# Patient Record
Sex: FEMALE | Race: WHITE | Employment: STUDENT | URBAN - METROPOLITAN AREA
[De-identification: names, ages, dates, MRNs, and addresses within clinical notes are randomized per-mention and may not be internally consistent; named-entity substitution may affect disease eponyms.]

---
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
## 2023-01-19 ENCOUNTER — TELEPHONE (OUTPATIENT)
Dept: PRIMARY CARE CLINIC | Age: 23
End: 2023-01-19

## 2023-01-19 RX ORDER — ALBUTEROL SULFATE 90 UG/1
2 AEROSOL, METERED RESPIRATORY (INHALATION) EVERY 6 HOURS PRN
Qty: 1 EACH | Refills: 0 | OUTPATIENT
Start: 2023-01-19

## 2023-01-19 NOTE — TELEPHONE ENCOUNTER
Tried to call patient, phone number is out of service.  Patient will need a new patient appointment before any medications can be refilled, she has not been seen since 2020

## 2023-01-25 RX ORDER — ALBUTEROL SULFATE 90 UG/1
2 AEROSOL, METERED RESPIRATORY (INHALATION) EVERY 6 HOURS PRN
Qty: 1 EACH | Refills: 0 | OUTPATIENT
Start: 2023-01-25

## 2023-03-07 ENCOUNTER — OFFICE VISIT (OUTPATIENT)
Dept: PRIMARY CARE CLINIC | Age: 23
End: 2023-03-07

## 2023-03-07 VITALS
BODY MASS INDEX: 29.88 KG/M2 | OXYGEN SATURATION: 98 % | HEIGHT: 64 IN | RESPIRATION RATE: 12 BRPM | DIASTOLIC BLOOD PRESSURE: 78 MMHG | WEIGHT: 175 LBS | TEMPERATURE: 98 F | HEART RATE: 78 BPM | SYSTOLIC BLOOD PRESSURE: 116 MMHG

## 2023-03-07 DIAGNOSIS — F41.9 ANXIETY: ICD-10-CM

## 2023-03-07 DIAGNOSIS — Z00.00 ENCOUNTER FOR WELL ADULT EXAM WITHOUT ABNORMAL FINDINGS: Primary | ICD-10-CM

## 2023-03-07 LAB
BILIRUBIN, POC: NORMAL
BLOOD URINE, POC: NORMAL
CLARITY, POC: NORMAL
COLOR, POC: NORMAL
GLUCOSE URINE, POC: NORMAL
KETONES, POC: NORMAL
LEUKOCYTE EST, POC: NORMAL
NITRITE, POC: NORMAL
PH, POC: 5.5
PROTEIN, POC: NORMAL
SPECIFIC GRAVITY, POC: 1.03
UROBILINOGEN, POC: 0.2

## 2023-03-07 RX ORDER — LEVONORGESTREL AND ETHINYL ESTRADIOL 0.1-0.02MG
1 KIT ORAL DAILY
COMMUNITY

## 2023-03-07 RX ORDER — ESCITALOPRAM OXALATE 10 MG/1
10 TABLET ORAL DAILY
Qty: 30 TABLET | Refills: 3 | Status: SHIPPED | OUTPATIENT
Start: 2023-03-07

## 2023-03-07 SDOH — ECONOMIC STABILITY: FOOD INSECURITY: WITHIN THE PAST 12 MONTHS, YOU WORRIED THAT YOUR FOOD WOULD RUN OUT BEFORE YOU GOT MONEY TO BUY MORE.: NEVER TRUE

## 2023-03-07 SDOH — ECONOMIC STABILITY: FOOD INSECURITY: WITHIN THE PAST 12 MONTHS, THE FOOD YOU BOUGHT JUST DIDN'T LAST AND YOU DIDN'T HAVE MONEY TO GET MORE.: NEVER TRUE

## 2023-03-07 SDOH — ECONOMIC STABILITY: INCOME INSECURITY: HOW HARD IS IT FOR YOU TO PAY FOR THE VERY BASICS LIKE FOOD, HOUSING, MEDICAL CARE, AND HEATING?: NOT HARD AT ALL

## 2023-03-07 SDOH — ECONOMIC STABILITY: TRANSPORTATION INSECURITY
IN THE PAST 12 MONTHS, HAS THE LACK OF TRANSPORTATION KEPT YOU FROM MEDICAL APPOINTMENTS OR FROM GETTING MEDICATIONS?: NO

## 2023-03-07 SDOH — ECONOMIC STABILITY: HOUSING INSECURITY
IN THE LAST 12 MONTHS, WAS THERE A TIME WHEN YOU DID NOT HAVE A STEADY PLACE TO SLEEP OR SLEPT IN A SHELTER (INCLUDING NOW)?: NO

## 2023-03-07 ASSESSMENT — PATIENT HEALTH QUESTIONNAIRE - PHQ9
1. LITTLE INTEREST OR PLEASURE IN DOING THINGS: 0
SUM OF ALL RESPONSES TO PHQ QUESTIONS 1-9: 0
SUM OF ALL RESPONSES TO PHQ QUESTIONS 1-9: 0
SUM OF ALL RESPONSES TO PHQ9 QUESTIONS 1 & 2: 0
2. FEELING DOWN, DEPRESSED OR HOPELESS: 0
SUM OF ALL RESPONSES TO PHQ QUESTIONS 1-9: 0
SUM OF ALL RESPONSES TO PHQ QUESTIONS 1-9: 0

## 2023-03-07 ASSESSMENT — ENCOUNTER SYMPTOMS
GASTROINTESTINAL NEGATIVE: 1
RESPIRATORY NEGATIVE: 1
EYES NEGATIVE: 1
ALLERGIC/IMMUNOLOGIC NEGATIVE: 1

## 2023-03-07 NOTE — PROGRESS NOTES
Well Adult Note  Name: Ruddy Eaton Date: 3/7/2023   MRN: [id number] Sex: Female   Age: 23 y.o. Ethnicity: Declined   : 2000 Race: White (non-)      Linus Pollock is here for well adult exam.  History:  Pt is here for annual physical exam   Depression: Patient complains of depression. She complains of depressed mood, difficulty concentrating, fatigue, feelings of worthlessness/guilt, and psychomotor agitation. Onset was approximately several months ago, gradually worsening since that time. She denies current suicidal and homicidal plan or intent. Family history significant for anxiety. Possible organic causes contributing are: none. Risk factors: positive family history in  father and mother Previous treatment includes  non3  and none. She complains of the following side effects from the treatment: none. Review of Systems   Constitutional: Negative. HENT: Negative. Eyes: Negative. Respiratory: Negative. Cardiovascular: Negative. Gastrointestinal: Negative. Endocrine: Negative. Genitourinary: Negative. Musculoskeletal: Negative. Skin: Negative. Allergic/Immunologic: Negative. Neurological: Negative. Psychiatric/Behavioral:  Positive for agitation, decreased concentration and dysphoric mood. The patient is nervous/anxious. All other systems reviewed and are negative. Allergies   Allergen Reactions    Strawberry Flavor [Flavoring Agent]          Prior to Visit Medications    Medication Sig Taking?  Authorizing Provider   albuterol sulfate HFA (PROVENTIL HFA) 108 (90 Base) MCG/ACT inhaler Inhale 2 puffs into the lungs every 6 hours as needed for Wheezing Yes Leela President, MD   levonorgestrel-ethinyl estradiol (LESSINA) 0.1-20 MG-MCG per tablet Take 1 tablet by mouth daily  Historical Provider, MD   Naproxen Sodium 220 MG CAPS Take 1 capsule by mouth daily  Patient not taking: Reported on 3/7/2023  Historical Provider, MD   fluticasone [facility]) 50 MCG/ACT nasal spray 1 spray by Nasal route daily. Patient not taking: Reported on 3/7/2023  Jeremiah Mckeon MD         Past Medical History:   Diagnosis Date    Asthma        No past surgical history on file. Family History   Problem Relation Age of Onset    Breast Cancer Mother     Breast Cancer Maternal Grandmother        Social History     Tobacco Use    Smoking status: Never    Smokeless tobacco: Never   Substance Use Topics    Alcohol use: Not Currently     Alcohol/week: 2.0 standard drinks     Types: 2 Drinks containing 0.5 oz of alcohol per week    Drug use: Never       Objective   /78 (Site: Left Upper Arm, Position: Sitting, Cuff Size: Large Adult)   Pulse 78   Temp 98 °F (36.7 °C) (Temporal)   Resp 12   Ht 5' 3.5\" (1.613 m)   Wt 175 lb (79.4 kg)   LMP 02/28/2023   SpO2 98%   BMI 30.51 kg/m²   Wt Readings from Last 3 Encounters:   03/07/23 175 lb (79.4 kg)   01/20/20 171 lb (77.6 kg) (94 %, Z= 1.53)*   08/06/19 168 lb 9.6 oz (76.5 kg) (93 %, Z= 1.50)*     * Growth percentiles are based on CDC (Girls, 2-20 Years) data. There were no vitals filed for this visit. Physical Exam  Vitals reviewed. Constitutional:       General: She is not in acute distress. Appearance: She is well-developed. She is not diaphoretic. HENT:      Head: Normocephalic and atraumatic. Right Ear: External ear normal.      Left Ear: External ear normal.      Nose: Nose normal.      Mouth/Throat:      Pharynx: No oropharyngeal exudate. Eyes:      General: No scleral icterus. Right eye: No discharge. Left eye: No discharge. Conjunctiva/sclera: Conjunctivae normal.      Pupils: Pupils are equal, round, and reactive to light. Neck:      Thyroid: No thyromegaly. Vascular: No JVD. Trachea: No tracheal deviation. Cardiovascular:      Rate and Rhythm: Normal rate and regular rhythm. Heart sounds: Normal heart sounds. No murmur heard. No friction rub. No gallop. Pulmonary:      Effort: Pulmonary effort is normal. No respiratory distress. Breath sounds: Normal breath sounds. No stridor. No wheezing or rales. Chest:      Chest wall: No tenderness. Abdominal:      General: Bowel sounds are normal. There is no distension. Palpations: Abdomen is soft. There is no mass. Tenderness: There is no abdominal tenderness. There is no guarding or rebound. Musculoskeletal:         General: No tenderness. Normal range of motion. Cervical back: Normal range of motion and neck supple. Lymphadenopathy:      Cervical: No cervical adenopathy. Skin:     General: Skin is warm and dry. Coloration: Skin is not pale. Findings: No erythema or rash. Neurological:      Mental Status: She is alert and oriented to person, place, and time. Cranial Nerves: No cranial nerve deficit. Motor: No abnormal muscle tone. Coordination: Coordination normal.      Deep Tendon Reflexes: Reflexes are normal and symmetric. Reflexes normal.   Psychiatric:         Behavior: Behavior normal.         Thought Content: Thought content normal.         Judgment: Judgment normal.         Assessment   Plan    Diagnosis Orders   1.  Encounter for well adult exam without abnormal findings  Basic Metabolic Panel    CBC    Hemoglobin A1C    Lipid Panel    TSH        See orders   Recommend counseling   Close follow up              Personalized Preventive Plan   Current Health Maintenance Status  Immunization History   Administered Date(s) Administered    DTaP 2002, 2002, 2002, 07/25/2003, 07/16/2007    HPV 9-valent [facility]) 08/25/2016    HPV Quadrivalent (Gardasil) 06/13/2016    Hepatitis A 07/07/2014, 06/13/2016    Hepatitis B 2002, 2002, 2002    Hib, unspecified 2002, 2002, 2002, 12/31/2003    MMR 07/25/2003, 07/16/2007    Meningococcal MCV4P (Menactra) 08/05/2013, 07/11/2018    Polio IPV (IPOL) 2002, 2002, 10/31/2003, 07/16/2007    Tdap (Boostrix, Adacel) 08/05/2013    Varicella (Varivax) 12/31/2003, 07/16/2007        Health Maintenance   Topic Date Due    COVID-19 Vaccine (1) Never done    Pneumococcal 0-64 years Vaccine (1 - PCV) Never done    HPV vaccine (3 - 2-dose series) 12/13/2016    HIV screen  Never done    Chlamydia/GC screen  Never done    Hepatitis C screen  Never done    Flu vaccine (1) Never done    DTaP/Tdap/Td vaccine (7 - Td or Tdap) 08/05/2023    Depression Screen  03/07/2024    Hepatitis A vaccine  Completed    Hib vaccine  Completed    Varicella vaccine  Completed    Meningococcal (ACWY) vaccine  Completed     Recommendations for Preventive Services Due: see orders and patient instructions/AVS.    No follow-ups on file.

## 2023-03-07 NOTE — PATIENT INSTRUCTIONS
Starting a Weight Loss Plan: Care Instructions  Overview     If you're thinking about losing weight, it can be hard to know where to start. Your doctor can help you set up a weight loss plan that best meets your needs. You may want to take a class on nutrition or exercise, or you could join a weight loss support group. If you have questions about how to make changes to your eating or exercise habits, ask your doctor about seeing a registered dietitian or an exercise specialist.  It can be a big challenge to lose weight. But you don't have to make huge changes at once. Make small changes, and stick with them. When those changes become habit, add a few more changes. If you don't think you're ready to make changes right now, try to pick a date in the future. Make an appointment to see your doctor to discuss whether the time is right for you to start a plan. Follow-up care is a key part of your treatment and safety. Be sure to make and go to all appointments, and call your doctor if you are having problems. It's also a good idea to know your test results and keep a list of the medicines you take. How can you care for yourself at home? Set realistic goals. Many people expect to lose much more weight than is likely. A weight loss of 5% to 10% of your body weight may be enough to improve your health. Get family and friends involved to provide support. Talk to them about why you are trying to lose weight, and ask them to help. They can help by participating in exercise and having meals with you, even if they may be eating something different. Find what works best for you. If you do not have time or do not like to cook, a program that offers meal replacement bars or shakes may be better for you. Or if you like to prepare meals, finding a plan that includes daily menus and recipes may be best.  Ask your doctor about other health professionals who can help you achieve your weight loss goals.   A dietitian can help you make healthy changes in your diet. An exercise specialist or  can help you develop a safe and effective exercise program.  A counselor or psychiatrist can help you cope with issues such as depression, anxiety, or family problems that can make it hard to focus on weight loss. Consider joining a support group for people who are trying to lose weight. Your doctor can suggest groups in your area. Where can you learn more? Go to http://www.woods.com/ and enter U357 to learn more about \"Starting a Weight Loss Plan: Care Instructions. \"  Current as of: August 25, 2022               Content Version: 13.5  © 7441-9632 [employer]. Care instructions adapted under license by [facility] [place] ([facility]). If you have questions about a medical condition or this instruction, always ask your healthcare professional. [address] any warranty or liability for your use of this information.

## 2023-03-17 ENCOUNTER — OFFICE VISIT (OUTPATIENT)
Dept: PSYCHOLOGY | Age: 23
End: 2023-03-17

## 2023-03-17 DIAGNOSIS — F41.0 GENERALIZED ANXIETY DISORDER WITH PANIC ATTACKS: Primary | ICD-10-CM

## 2023-03-17 DIAGNOSIS — F33.1 MODERATE EPISODE OF RECURRENT MAJOR DEPRESSIVE DISORDER (HCC): ICD-10-CM

## 2023-03-17 DIAGNOSIS — F41.1 GENERALIZED ANXIETY DISORDER WITH PANIC ATTACKS: Primary | ICD-10-CM

## 2023-03-17 ASSESSMENT — PATIENT HEALTH QUESTIONNAIRE - PHQ9
SUM OF ALL RESPONSES TO PHQ QUESTIONS 1-9: 16
8. MOVING OR SPEAKING SO SLOWLY THAT OTHER PEOPLE COULD HAVE NOTICED. OR THE OPPOSITE, BEING SO FIGETY OR RESTLESS THAT YOU HAVE BEEN MOVING AROUND A LOT MORE THAN USUAL: 2
7. TROUBLE CONCENTRATING ON THINGS, SUCH AS READING THE NEWSPAPER OR WATCHING TELEVISION: 3
4. FEELING TIRED OR HAVING LITTLE ENERGY: 3
SUM OF ALL RESPONSES TO PHQ9 QUESTIONS 1 & 2: 4
9. THOUGHTS THAT YOU WOULD BE BETTER OFF DEAD, OR OF HURTING YOURSELF: 0
1. LITTLE INTEREST OR PLEASURE IN DOING THINGS: 2
3. TROUBLE FALLING OR STAYING ASLEEP: 0
SUM OF ALL RESPONSES TO PHQ QUESTIONS 1-9: 16
5. POOR APPETITE OR OVEREATING: 2
2. FEELING DOWN, DEPRESSED OR HOPELESS: 2
6. FEELING BAD ABOUT YOURSELF - OR THAT YOU ARE A FAILURE OR HAVE LET YOURSELF OR YOUR FAMILY DOWN: 2

## 2023-03-17 ASSESSMENT — ANXIETY QUESTIONNAIRES
5. BEING SO RESTLESS THAT IT IS HARD TO SIT STILL: 2
4. TROUBLE RELAXING: 2
7. FEELING AFRAID AS IF SOMETHING AWFUL MIGHT HAPPEN: 3
1. FEELING NERVOUS, ANXIOUS, OR ON EDGE: 3
2. NOT BEING ABLE TO STOP OR CONTROL WORRYING: 3
IF YOU CHECKED OFF ANY PROBLEMS ON THIS QUESTIONNAIRE, HOW DIFFICULT HAVE THESE PROBLEMS MADE IT FOR YOU TO DO YOUR WORK, TAKE CARE OF THINGS AT HOME, OR GET ALONG WITH OTHER PEOPLE: SOMEWHAT DIFFICULT
3. WORRYING TOO MUCH ABOUT DIFFERENT THINGS: 3
GAD7 TOTAL SCORE: 19
6. BECOMING EASILY ANNOYED OR IRRITABLE: 3

## 2023-03-17 NOTE — PATIENT INSTRUCTIONS
Return to see Dr. Mando Plummer in 2 weeks  Practice box breathing daily to aid in anxiety/stress response  Practice grounding exercises  Review handout on anxiety   Practice progressive muscle relaxation to aid in relaxation  Continue exercising  Call PC office if mood significantly worsens or you begin having suicidal thoughts    How to do Box Breathing  Step 1: Breathe in counting to four slowly. Feel the air enter your lungs. Step 2: Hold your breath for 4 seconds. Try to avoid inhaling or exhaling for 4 seconds. Step 3: Slowly exhale through your mouth for 4 seconds. Step 4: Repeat steps 1 to 3 until you feel re-centered. Grounding  Grounding is a technique that helps keep you focused on the present moment by reorienting you to reality in the here-and-now. Grounding skills can be helpful in managing overwhelming feelings or intense anxiety. They help you to regain your mental focus from an often intensely emotional state. Grounding skills occur within two specific approaches:   Sensory Awareness and Cognitive Awareness    1. Sensory Awareness (awareness of senses)    Grounding Exercise #1:   Keep your eyes open, look around the room, notice your surroundings, notice  details. Hold a pillow, stuffed animal or a ball. Place a cool cloth or hold something cool (e.g., can of soda) on your forehead or the inside of your wrist   Listen to soothing music   Put your feet firmly on the ground   FOCUS on someones voice or a neutral conversation. Grounding Exercise #2:   The [id number] Exercise   Name 5 things you can see in the room with you. Name 4 things you can feel Cookie Esteban on my back or feet on floor)   Name 3 things you can hear right now (fingers tapping on keyboard or tv)   Name 2 things you can smell right now (or, 2 things you like the smell of)   Name 1 good thing about yourself    Grounding Exercise #3  5/5/5 Grounding Exercise  What are 5 things you can see?   What are 5 things you can feel?  What are 5 things you can hear?    -Repeat with 4 different observations for each, 3 different for each, 2 different for each, etc.   -If you get to 1 and are still feeling anxious, re-start at 5 with 5 different things you can see. 2. Cognitive Awareness (awareness of thoughts)    Grounding Exercise #4: Re-orient yourself in place and time by asking yourself some or all of these questions:  1. Where am I?  2. What is today? 3. What is the date? 4. What is the month? 5. What is the year? 6. How old am I?  7. What season is it? The Physiology of Anxiety    When you sense danger, your brain activates your autonomic nervous system. The two branches of your autonomic nervous system, the sympathetic and the parasympathetic, control your body's energy level in order to prepare you for action. The sympathetic nervous system controls your fight or flight response and releases energy to prepare you for action. The parasympathetic nervous system is your bodys relaxation/recovery system:  it returns your body to a normal state when the danger is over. The sympathetic nervous system is an all-or-none system. That means that when its activated it quickly turns on all of its component parts (which is a great way for an emergency response system to operate):    Rapid Heart Rate, Rapid Breathing:  The alarm reaction increases the heart rate and breathing rate so that we are alert and our muscles are ready for action. These changes also help insure that the muscles and brain will have enough oxygen and energy for defense. At the same time, blood flow to the skin decreases, which prevents us from losing as much blood if we are wounded. Sweating:  Sweating helps to cool the body during exertion, making it more efficient. Blase Eron is what some people feel when sweating occurs at the same time that blood flow to the skin decreases.     Tight Chest, Tingling, Numbness, Hot Flushes, Trembling: Hyperventilation occurs when we breathe rapidly but do not expend the energy with muscle action, like revving a car while holding down the brake. This can lead to feelings of tingling and numbness, hot flushes, and increased sweating. When rapid chest breathing and muscle tension occur at the same time, people feel chest pain, breathlessness, and choking. Upset Stomach, Diarrhea:  Digestion isnt needed during times of danger, and the sympathetic nervous system shuts it down, leading to dry mouth and an upset stomach. Since excess weight isnt needed in times of acute danger, the body may eliminate the lower digestive track, which causes diarrhea. Blurred Vision, Derealization, Depersonalization: It is common for our pupils to dilate during times of danger. Although this improves night vision by increasing the amount of light that can enter the eye, it can also lead to blurred or brighter vision during the day. These changes in visual perception, when combined with the other unusual physical sensations mentioned above, can contribute to feelings of unreality, such as derealization and depersonalization. When this alarm system activates the brain automatically takes in, like a flashbulb camera, everything that can be taken in by the five senses (sights, sounds, smells, tastes, and touch). The goal is to keep us alive in the future. If I can remember everything about this situation then I can prevent myself from ever being in a similar situation. While the goal is survival, a natural byproduct is that the brain takes in EVERYTHING which means that certain things about our environment that were once neutral (i.e., the smell of car exhaust, a crowded restaurant, etc.) now have a negative association. For example, in a classic scientific experiment, scientists were measuring the salivation of dogs to the presentation of dog food.   In the process of measuring the salivation of dogs they found, unexpectedly that dogs began to salivate not only when food was present, but at the site of researchers in white coats and the ring of bell caused by the opening of a door. It was concluded that the dogs began to associate these previously neutral stimuli with getting food and, as a result, they would salivate if these were present even without the presence of food. WHY WONT IT GO AWAY? Avoidance is the key. If you stay away from those things that illicit the fear response your brain gets to maintain that it is dangerous and if you leave a situation because it illicits the fear response your brain convinces you that without leaving you would have never survived. If we never got back on the bike after we fell for the first time that would be our last memory of riding bikes and none of us would be riding bikes today. WHAT DO I DO NOW? The answer is simple, but the act is difficult. We have to go towards those things our brain tells us we must avoid. The goal of in-vivo exposure is to relearn through gradual exposure the following: These uncomfortable feelings will decrease with time. I do not have to leave or avoid them all together as they will decrease. There is nothing to be afraid of. You will see that the things you have been avoiding are not inherently dangerous, but you cannot learn that without exposure. I can do it. No longer do your actions have to be dictated by anxiety or fear. Personal Thought  Control. Our thinking often creates anxiety for us. Getting better control of our thinking can go a long way in helping us cope. The following steps can be useful. Let yourself become aware of thoughts you have when you are anxious. What are the words that you are saying to yourself at that moment? Sometimes it takes a little practice before we become aware of our thoughts.   Some examples might be:  I know something bad is going to happen, or This is horrible or Laron Garcia is this happening to me!?  Write your thoughts down. Its much easier to work with our thoughts, analyze them, and replace them if they are in black and white.   Ask yourself the following questions about your thoughts:  Is it true? (Is it logically correct? Where is the evidence to support the truth of that thought? Are there alternative ways of thinking that would be more correct?). If a thought is not as true as it could be, replace it with a more realistic and helpful one. The majority of thoughts we have that generate anxiety are not the most realistic appraisals of the situation. So what? (If this is logically correct, what does it mean to me? Is there anything I can do about the situation? Is it in my best interest to get anxious about this?). Use coping self-statements. When feeling anxious, you may be able to tell yourself automatic phrases without thinking too much about it. A couple of examples would be phrases such as Its OK, I can handle it, or Ive been through things like this before and have done all right.   Notice that these statements tend to be true for all of us. Notice a change in your emotional state as you change your thinking. As your thoughts become more realistic, you will probably notice a decrease in anxiety and tension, and an increase in your ability to cope. The Stress Response and How It Can Affect You   The stress response, or fight or flight response is the emergency reaction system of the body. It is there to keep you safe in emergencies. The stress response includes physical and thought responses to your perception of various situations. When the stress response is turned on, your body may release substances like adrenaline and cortisol. Your organs are programmed to respond in certain ways to situations that are viewed as challenging or threatening. The stress response can work against you.  You can turn it on when you dont really need it and, as a result, perceive something as an emergency when its really not. It can turn on when you are just thinking about past or future events. Harmless, chronic conditions can be intensified by the stress response activating too often, with too much intensity, or for too long. Stress responses can be different for different individuals. Below is a list of some common stress related responses people have. (Catarina the responses you have had in the last 2 weeks.)     Physical Responses   Muscle aches   Insomnia   ? Heart rate   Headache   Weight gain   Nausea   Constipation   Dry mouth   Muscle twitching  Weight loss   Low energy   Weakness   Tight chest   Diarrhea   Dizziness   Trembling   Stomach cramps  Chills    Hot flashes   Sweating   Pounding heart  Choking feeling  Chest pain   Leg cramps   Numb hands/feet Dry throat   Appetite change  Face flushing   ? Blood pressure  Light-headedness  Feeling faint       Troubleswallowing   Rash ? Urination   Neck pain     Tingling hands/feet     Emotional and Thought Responses   Restlessness   Agitation   Insecurity            Worthlessness   Anxiety   Stress    Depression            Hopelessness   Guilt    Defensiveness  Anger           Racing thoughts   Nightmares   Intense thinking  Sensitivity          Expecting the worst   Numbness   Lack of motivation  Mood swings             Forgetfulness   ? Concentration  Rigidity              Preoccupation  Intolerance     Behavioral Responses   Avoidance   Withdrawal   Neglect   ? Alcohol use    Smoking   ? Eating   Arguing       Poor appearance   ? Spending   Poor hygiene   ? Eating  Seeking reassurance   Nail biting   Skin picking   ? Talking        ? Body checking   Sexual problems  Foot tapping  Fidgeting Rapid walking    ? Exercise   Teeth clenching           Multitasking  Aggressive speaking       ? Fun activities  ? Sleeping      ?  Relaxing activities     Seeking information     The parasympathetic nervous system in your body is designed to turn on your bodys relaxation response. Your behaviors and thinking can keep your bodys natural relaxation response from operating at its best.   Getting your body to relax on a daily basis for at least brief periods can help decrease unpleasant stress responses. Learning to relax your body, through specific breathing and relaxation exercises as well as by minimizing stressful thinking, can help your bodys natural relaxation system be more effective. Progressive Muscle Relaxation  Progressive Muscle Relaxation is a relaxation technique used to release stress. It can relax the muscles and lower blood pressure, heart rate, and respiration. Progressive Muscle Relaxation is the tensing and then relaxing each muscle group of the body, one group at a time. Though this technique is simple it may take several sessions before it is 'mastered.'   Some people prefer to listen to an audiotape (or CD or mp3) that guides one through progressive muscle relaxation. The scripts are usually about 20 minutes long. Progressive muscle relaxation may be done sitting or lying down. Tense up a group of muscles - tense hard but don't strain - and hold for about 5-10 seconds. Release the tension from the muscles all at once. Stay relaxed for 10 - 20 seconds. Some people prefer to count, for example:  Tense for count of 5  Release all at once  Rest for count of 10  . ..or  Tense for count of 10  Release all at once  Rest for count of 20  Pay close attention to the feeling of relaxation when you release the contracted muscles. When going through the muscle groups, some people start with the hands, others with the feet. You may do one side of the body (hand, arm, leg, foot) at a time or do both sides at the same time. Listening to a prerecorded script that guides you through the process is helpful.   Sample of Progressive Muscle Relaxation Exercise:   Hands - Clench fists  tense for 5, release, rest for 10 Right forearms and hands - Extend arm, elbow locked, and bend hand back at the wrist  tense for 5, release, rest for 10      Upper right arm - Bend arms at elbows and flex biceps  tense for 5, release, rest for 10     Forehead - wrinkle forehead into frown, tense, release, rest, and/or raise eyebrows  tense for 5, release, rest for 10      Eyes - close eyes tightly, hold and release  tense for 5, release, rest for 10      Mouth - press lips tightly together  tense for 5, release, rest for 10      Jaw - open mouth wide and stick out tongue  tense for 5, release, rest for 10      Buttocks - tense  tense for 5, release, rest for 10      Abdomen  tense for 5, release, rest for 10      Chest  tense for 5, release, rest for 10      Back - arch back  tense for 5, release, rest for 10      Neck and shoulders  tense for 5, release, rest for 10      Thighs  tense for 5, release, rest for 10      Lower legs and feet - Point toes toward shin  tense for 5, release, rest for 10      Feet - Point toes and curl them under  tense for 5, release, rest for 10     You may repeat relaxing and tensing muscle groups that have you have already done to relax them further.

## 2023-03-17 NOTE — PROGRESS NOTES
Behavioral Health Consultation  ZUNILDA ERNST Psy.D. Psychologist  3/17/2023  9:01 AM EDT      Time spent with Patient: 30 minutes  This is patient's first [facility] appointment. Reason for Consult: depression/anxiety  Referring Provider: Lizbeth Krishnamurthy MD  [place] [id number] [id number]     Pt provided informed consent for the behavioral health program. Discussed with patient model of service to include the limits of confidentiality (i.e. abuse reporting, suicide intervention, etc.) and short-term intervention focused approach. Pt indicated understanding. Feedback given to PCP. S:  Patient presents with concerns about depression and anxiety. Overworks herself a lot. Feels she does not have enough time in the day. Shows horses but stopped because she does not have time for it. Does not stop moving throughout day. School has contributed to anxiety and stress. Reported panic attacks when feeling overwhelmed. Crying, shaking, feeling hot, heart racing, SOB, feeling dizzy, tingling. Episodes last around 15 minutes. Currently having them few times/week. Reported father \"puts her down\" and puts a lot of pressure on her and her brother to work. Contributes to low mood. Reported low mood and anxious sx have been present since childhood. Patient finds relief from spending time with friends, going to the gym. Goals for treatment include building coping skills for anxiety, mood. Patient lives with parents. She is a full-time college student. Works at [employer]. Daily caffeine use includes coffee/AM. Denied cigarette use, current alcohol use (two drinks/weekend), denied illegal drug use. There is regular exercise. Patient describes fatigue throughout day. Denied issues initiating or staying asleep. Patient enjoys the following hobbies: horses, exercising, spending time with friends. Patient describes good social support from mother and friend.  She does not identify with specific Orthodox/culture. Familial MH history is positive for anxiety (biological mother and father). Mental Health History  Psychotropic medications: lexapro 10mg; recently started this- feels it helps with overthinking and is less overwhelmed  Psychiatric hospitalizations: denied  Suicidal ideation/homicidal ideation: denied  Suicide attempts: denied  Previous outpatient treatment: denied    O:  MSE:    Attitude: cooperative and friendly  Consciousness: alert  Orientation: oriented to person, place, time, general circumstance  Memory: recent and remote memory intact  Attention/Concentration: intact during session  Speech: normal rate and volume, well-articulated  Mood: down  Affect:  mildly dysphoric  Perception: within normal limits  Thought Content: within normal limits  Thought Process: logical, coherent and goal-directed  Insight: good  Judgment: intact  Ability to understand instructions: Yes  Ability to respond meaningfully: Yes  Morbid Ideation: no   Suicide Assessment: no suicidal ideation, plan, or intent  Homicidal Ideation: no    History:    Medications:   Current Outpatient Medications   Medication Sig Dispense Refill    levonorgestrel-ethinyl estradiol (LESSINA) 0.1-20 MG-MCG per tablet Take 1 tablet by mouth daily      escitalopram (LEXAPRO) 10 MG tablet Take 1 tablet by mouth daily 30 tablet 3    albuterol sulfate HFA (PROVENTIL HFA) 108 (90 Base) MCG/ACT inhaler Inhale 2 puffs into the lungs every 6 hours as needed for Wheezing 1 Inhaler 0     No current facility-administered medications for this visit.      Social History:   Social History     Socioeconomic History    Marital status: Unknown     Spouse name: Not on file    Number of children: Not on file    Years of education: Not on file    Highest education level: Not on file   Occupational History    Not on file   Tobacco Use    Smoking status: Never    Smokeless tobacco: Never   Substance and Sexual Activity    Alcohol use: Not Currently Alcohol/week: 2.0 standard drinks     Types: 2 Drinks containing 0.5 oz of alcohol per week    Drug use: Never    Sexual activity: Yes     Partners: Male   Other Topics Concern    Not on file   Social History Narrative    Not on file     Social Determinants of Health     Financial Resource Strain: Low Risk     Difficulty of Paying Living Expenses: Not hard at all   Food Insecurity: No Food Insecurity    Worried About [address] in the Last Year: Never true    [address] in the Last Year: Never true   Transportation Needs: No Transportation Needs    Lack of Transportation (Medical): No    Lack of Transportation (Non-Medical): No   Physical Activity: Not on file   Stress: Not on file   Social Connections: Not on file   Intimate Partner Violence: Not on file   Housing Stability: Unknown    Unable to Pay for Housing in the Last Year: Not on file    Number of Places Lived in the Last Year: Not on file    Unstable Housing in the Last Year: No     TOBACCO:   reports that she has never smoked. She has never used smokeless tobacco.  ETOH:   reports that she does not currently use alcohol after a past usage of about 2.0 standard drinks per week. Family History:   Family History   Problem Relation Age of Onset    Breast Cancer Mother     Breast Cancer Maternal Grandmother        A:  Administered PHQ-9 and CAITLYN-7 (see below). Patient endorses Moderately Severe symptoms of depression and Severe symptoms of anxiety. Denied suicidal ideation. Insight and motivation are good.       PHQ-9 Questionaire 3/17/2023 3/7/2023 1/20/2020 3/4/2019 7/11/2018   Little interest or pleasure in doing things 2 0 0 0 0   Feeling down, depressed, or hopeless 2 0 0 0 0   Trouble falling or staying asleep, or sleeping too much 0 - 0 0 0   Feeling tired or having little energy 3 - 0 0 0   Poor appetite or overeating 2 - 0 0 0   Feeling bad about yourself - or that you are a failure or have let yourself or your family down 2 - 0 0 0 Trouble concentrating on things, such as reading the newspaper or watching television 3 - 0 0 0   Moving or speaking so slowly that other people could have noticed. Or the opposite - being so fidgety or restless that you have been moving around a lot more than usual 2 - 0 0 0   Thoughts that you would be better off dead, or of hurting yourself in some way 0 - 0 0 0   PHQ-9 Total Score 16 0 0 0 0     PHQ Scores 3/17/2023 3/7/2023 1/20/2020 3/4/2019 7/11/2018   PHQ2 Score 4 0 0 0 0   PHQ9 Score 16 0 0 0 0       Interpretation of Total Score Depression Severity: 1-4 = Minimal depression, 5-9 = Mild depression, 10-14 = Moderate depression, 15-19 = Moderately severe depression, 20-27 = Severe depression     CAITLYN-7 SCREENING 3/17/2023   Feeling nervous, anxious, or on edge Nearly every day   Not being able to stop or control worrying Nearly every day   Worrying too much about different things Nearly every day   Trouble relaxing More than half the days   Being so restless that it is hard to sit still More than half the days   Becoming easily annoyed or irritable Nearly every day   Feeling afraid as if something awful might happen Nearly every day   CAITLYN-7 Total Score 19   How difficult have these problems made it for you to do your work, take care of things at home, or get along with other people? Somewhat difficult     CAITLYN 7 SCORE 3/17/2023   CAITLYN-7 Total Score 19       Interpretation of Total Score. Anxiety Severity: Score 0-4: Minimal Anxiety. Score 5-9: Mild Anxiety. Score 10-14: Moderate Anxiety. Score greater than 15: Severe Anxiety. Diagnosis:  1. Generalized anxiety disorder with panic attacks    2.  Moderate episode of recurrent major depressive disorder (HCC)        Past Medical History:      Diagnosis Date    Asthma        Plan:  Pt interventions:  Established rapport, Conducted functional assessment, [place]-setting to identify pt's primary goals for [facility] visit / overall health, Supportive techniques, Emphasized self-care as important for managing overall health, Provided Psychoeducation re: anxiety and the stress response, and Emphasized importance of regular practice of relaxation strategies to target / promote anxiety, panic, and stress management    Pt Behavioral Change Plan:   See Pt Instructions.

## 2023-03-31 ENCOUNTER — OFFICE VISIT (OUTPATIENT)
Dept: PSYCHOLOGY | Age: 23
End: 2023-03-31
Payer: COMMERCIAL

## 2023-03-31 DIAGNOSIS — F33.1 MODERATE EPISODE OF RECURRENT MAJOR DEPRESSIVE DISORDER (HCC): ICD-10-CM

## 2023-03-31 DIAGNOSIS — F41.1 GENERALIZED ANXIETY DISORDER WITH PANIC ATTACKS: Primary | ICD-10-CM

## 2023-03-31 DIAGNOSIS — F41.0 GENERALIZED ANXIETY DISORDER WITH PANIC ATTACKS: Primary | ICD-10-CM

## 2023-03-31 PROCEDURE — 90832 PSYTX W PT 30 MINUTES: CPT

## 2023-03-31 ASSESSMENT — PATIENT HEALTH QUESTIONNAIRE - PHQ9
8. MOVING OR SPEAKING SO SLOWLY THAT OTHER PEOPLE COULD HAVE NOTICED. OR THE OPPOSITE, BEING SO FIGETY OR RESTLESS THAT YOU HAVE BEEN MOVING AROUND A LOT MORE THAN USUAL: 2
SUM OF ALL RESPONSES TO PHQ QUESTIONS 1-9: 14
7. TROUBLE CONCENTRATING ON THINGS, SUCH AS READING THE NEWSPAPER OR WATCHING TELEVISION: 3
5. POOR APPETITE OR OVEREATING: 2
6. FEELING BAD ABOUT YOURSELF - OR THAT YOU ARE A FAILURE OR HAVE LET YOURSELF OR YOUR FAMILY DOWN: 1
SUM OF ALL RESPONSES TO PHQ QUESTIONS 1-9: 14
SUM OF ALL RESPONSES TO PHQ9 QUESTIONS 1 & 2: 3
3. TROUBLE FALLING OR STAYING ASLEEP: 0
2. FEELING DOWN, DEPRESSED OR HOPELESS: 1
4. FEELING TIRED OR HAVING LITTLE ENERGY: 3
SUM OF ALL RESPONSES TO PHQ QUESTIONS 1-9: 14
1. LITTLE INTEREST OR PLEASURE IN DOING THINGS: 2
9. THOUGHTS THAT YOU WOULD BE BETTER OFF DEAD, OR OF HURTING YOURSELF: 0
SUM OF ALL RESPONSES TO PHQ QUESTIONS 1-9: 14

## 2023-03-31 ASSESSMENT — ANXIETY QUESTIONNAIRES
GAD7 TOTAL SCORE: 17
5. BEING SO RESTLESS THAT IT IS HARD TO SIT STILL: 2
2. NOT BEING ABLE TO STOP OR CONTROL WORRYING: 2
4. TROUBLE RELAXING: 3
7. FEELING AFRAID AS IF SOMETHING AWFUL MIGHT HAPPEN: 2
3. WORRYING TOO MUCH ABOUT DIFFERENT THINGS: 2
1. FEELING NERVOUS, ANXIOUS, OR ON EDGE: 3
6. BECOMING EASILY ANNOYED OR IRRITABLE: 3

## 2023-03-31 NOTE — PATIENT INSTRUCTIONS
Return to see Dr. Alonzo Vega in 2 weeks  Practice assertive communication handout  Begin to identify commonly used cognitive distortions   Review stress management self-talk handout below  Call PC office if mood significantly worsens    Assertive Communication     Assertiveness Is Simple but Hard    NonAssertive   Assertive   Aggressive     (Passive)            (Tactful)             (Rude)           H onest         X  H onest          X  H onest             X A ppropriate        X  A ppropriate                 A ppropriate             X R espectful        X  R espectful             R espectful     D irect                   X  D irect        X  D irect      Assertiveness involves respecting your rights and the rights of others. Important Facts About Assertiveness      Use I or me statements such as When you do ______, I feel _____.     Voice tone, eye contact, and body posture are important parts of assertive communication. Use a steady and calm voice, stand or sit up straight, look the other person in the eyes without glaring. Feelings are usually only one word (e.g. angry, anxious, happy, sad, hurt, frustrated, joyful)    Remember, assertiveness doesnt guarantee that you will get what you want or that the other person will understand your concerns or be happy with what you said. It does improve the chances that the other person will understand what you want or how you feel and thus improve your chances of communicating effectively. Four Essential Steps to Assertive Communication   1. Tell the person what you think about their behavior without accusing them. 2. Tell them how you feel when they behave a certain way. 3. Tell them how their behavior affects you and your relationship with them. 4. Tell them what you would prefer them to do instead.      XYZ* Formula for Effective Communication   The goal of the XYZ* formula is to express the way you feel (internal world) in response to others

## 2023-03-31 NOTE — PROGRESS NOTES
than half the days Nearly every day   CAITLYN-7 Total Score 17 19   How difficult have these problems made it for you to do your work, take care of things at home, or get along with other people? - Somewhat difficult     CAITLYN 7 SCORE 3/31/2023 3/17/2023   CAITLYN-7 Total Score 17 19       Interpretation of Total Score. Anxiety Severity: Score 0-4: Minimal Anxiety. Score 5-9: Mild Anxiety. Score 10-14: Moderate Anxiety. Score greater than 15: Severe Anxiety. Diagnosis:    1. Generalized anxiety disorder with panic attacks    2. Moderate episode of recurrent major depressive disorder (HCC)        Past Medical History      Diagnosis Date    Asthma        Plan:  Pt interventions:  Trained in strategies for increasing balanced thinking, Discussed self-care (sleep, nutrition, rewarding activities, social support, exercise), Supportive techniques, Emphasized self-care as important for managing overall health, Provided Psychoeducation re: CBT, and Emphasized importance of regular practice of relaxation strategies to target / promote anxiety/stress management    Pt Behavioral Change Plan:   See Pt Instructions.

## 2023-04-04 ENCOUNTER — OFFICE VISIT (OUTPATIENT)
Dept: PRIMARY CARE CLINIC | Age: 23
End: 2023-04-04
Payer: COMMERCIAL

## 2023-04-04 VITALS
SYSTOLIC BLOOD PRESSURE: 112 MMHG | BODY MASS INDEX: 29.91 KG/M2 | HEIGHT: 64 IN | WEIGHT: 175.2 LBS | DIASTOLIC BLOOD PRESSURE: 70 MMHG | RESPIRATION RATE: 12 BRPM | TEMPERATURE: 97.9 F

## 2023-04-04 DIAGNOSIS — F32.A ANXIETY AND DEPRESSION: Primary | ICD-10-CM

## 2023-04-04 DIAGNOSIS — F41.9 ANXIETY AND DEPRESSION: Primary | ICD-10-CM

## 2023-04-04 PROCEDURE — 99214 OFFICE O/P EST MOD 30 MIN: CPT | Performed by: FAMILY MEDICINE

## 2023-04-04 RX ORDER — ALBUTEROL SULFATE 90 UG/1
2 AEROSOL, METERED RESPIRATORY (INHALATION) EVERY 6 HOURS PRN
Qty: 1 EACH | Refills: 0 | Status: SHIPPED | OUTPATIENT
Start: 2023-04-04

## 2023-04-04 RX ORDER — BUSPIRONE HYDROCHLORIDE 5 MG/1
5 TABLET ORAL 3 TIMES DAILY PRN
Qty: 60 TABLET | Refills: 1 | Status: SHIPPED | OUTPATIENT
Start: 2023-04-04 | End: 2023-05-04

## 2023-04-04 NOTE — PROGRESS NOTES
SUBJECTIVE:  Patient ID: Niles Matos is a 23 y.o. y.o. female     HPI   Depression: Patient is here for follow up on anxiety and depression she thinks the medication has helped some she still have some time where she feels very anxious nervous especially when she has more requirement more things to do more work to do at home she has been meeting with Dr. Sylwia Ardon  Has been helpful she denies current suicidal and homicidal plan or intent. Family history significant for anxiety. Possible organic causes contributing are: none. Risk factors: positive family history in  father and mother Previous treatment includes  NA. She complains of the following side effects from the treatment: none  Past Medical History:   Diagnosis Date    Asthma       No past surgical history on file.   Family History   Problem Relation Age of Onset    Breast Cancer Mother     Breast Cancer Maternal Grandmother      Social History     Socioeconomic History    Marital status: Unknown     Spouse name: None    Number of children: None    Years of education: None    Highest education level: None   Tobacco Use    Smoking status: Never    Smokeless tobacco: Never   Substance and Sexual Activity    Alcohol use: Not Currently     Alcohol/week: 2.0 standard drinks     Types: 2 Drinks containing 0.5 oz of alcohol per week    Drug use: Never    Sexual activity: Yes     Partners: Male     Social Determinants of Health     Financial Resource Strain: Low Risk     Difficulty of Paying Living Expenses: Not hard at all   Food Insecurity: No Food Insecurity    Worried About Running Out of Food in the Last Year: Never true    Ran Out of Food in the Last Year: Never true   Transportation Needs: No Transportation Needs    Lack of Transportation (Medical): No    Lack of Transportation (Non-Medical): No   Housing Stability: Unknown    Unstable Housing in the Last Year: No     Current Outpatient Medications   Medication Sig Dispense Refill    levonorgestrel-ethinyl

## 2023-04-14 ENCOUNTER — OFFICE VISIT (OUTPATIENT)
Dept: PSYCHOLOGY | Age: 23
End: 2023-04-14
Payer: COMMERCIAL

## 2023-04-14 DIAGNOSIS — F33.1 MODERATE EPISODE OF RECURRENT MAJOR DEPRESSIVE DISORDER (HCC): ICD-10-CM

## 2023-04-14 DIAGNOSIS — F41.0 GENERALIZED ANXIETY DISORDER WITH PANIC ATTACKS: Primary | ICD-10-CM

## 2023-04-14 DIAGNOSIS — F41.1 GENERALIZED ANXIETY DISORDER WITH PANIC ATTACKS: Primary | ICD-10-CM

## 2023-04-14 PROCEDURE — 90832 PSYTX W PT 30 MINUTES: CPT

## 2023-04-14 ASSESSMENT — ANXIETY QUESTIONNAIRES
GAD7 TOTAL SCORE: 18
5. BEING SO RESTLESS THAT IT IS HARD TO SIT STILL: 2
6. BECOMING EASILY ANNOYED OR IRRITABLE: 3
4. TROUBLE RELAXING: 2
2. NOT BEING ABLE TO STOP OR CONTROL WORRYING: 3
7. FEELING AFRAID AS IF SOMETHING AWFUL MIGHT HAPPEN: 2
1. FEELING NERVOUS, ANXIOUS, OR ON EDGE: 3
3. WORRYING TOO MUCH ABOUT DIFFERENT THINGS: 3

## 2023-04-14 ASSESSMENT — PATIENT HEALTH QUESTIONNAIRE - PHQ9
1. LITTLE INTEREST OR PLEASURE IN DOING THINGS: 2
9. THOUGHTS THAT YOU WOULD BE BETTER OFF DEAD, OR OF HURTING YOURSELF: 0
SUM OF ALL RESPONSES TO PHQ QUESTIONS 1-9: 20
5. POOR APPETITE OR OVEREATING: 2
SUM OF ALL RESPONSES TO PHQ QUESTIONS 1-9: 20
7. TROUBLE CONCENTRATING ON THINGS, SUCH AS READING THE NEWSPAPER OR WATCHING TELEVISION: 3
6. FEELING BAD ABOUT YOURSELF - OR THAT YOU ARE A FAILURE OR HAVE LET YOURSELF OR YOUR FAMILY DOWN: 2
2. FEELING DOWN, DEPRESSED OR HOPELESS: 3
SUM OF ALL RESPONSES TO PHQ QUESTIONS 1-9: 20
3. TROUBLE FALLING OR STAYING ASLEEP: 3
8. MOVING OR SPEAKING SO SLOWLY THAT OTHER PEOPLE COULD HAVE NOTICED. OR THE OPPOSITE, BEING SO FIGETY OR RESTLESS THAT YOU HAVE BEEN MOVING AROUND A LOT MORE THAN USUAL: 2
SUM OF ALL RESPONSES TO PHQ QUESTIONS 1-9: 20
4. FEELING TIRED OR HAVING LITTLE ENERGY: 3
SUM OF ALL RESPONSES TO PHQ9 QUESTIONS 1 & 2: 5

## 2023-04-18 NOTE — PATIENT INSTRUCTIONS
Return to see Dr. Francisco Gotti in 2 weeks  Continue practicing assertive communication and advocating for yourself  Continue engagement in stress management strategies  Consider setting boundaries at work  Call [facility] office if mood significantly worsens

## 2023-04-28 ENCOUNTER — OFFICE VISIT (OUTPATIENT)
Dept: PSYCHOLOGY | Age: 23
End: 2023-04-28
Payer: COMMERCIAL

## 2023-04-28 DIAGNOSIS — F41.0 GENERALIZED ANXIETY DISORDER WITH PANIC ATTACKS: Primary | ICD-10-CM

## 2023-04-28 DIAGNOSIS — F41.1 GENERALIZED ANXIETY DISORDER WITH PANIC ATTACKS: Primary | ICD-10-CM

## 2023-04-28 DIAGNOSIS — F33.1 MODERATE EPISODE OF RECURRENT MAJOR DEPRESSIVE DISORDER (HCC): ICD-10-CM

## 2023-04-28 PROCEDURE — 90832 PSYTX W PT 30 MINUTES: CPT

## 2023-04-28 ASSESSMENT — ANXIETY QUESTIONNAIRES
1. FEELING NERVOUS, ANXIOUS, OR ON EDGE: 2
GAD7 TOTAL SCORE: 16
2. NOT BEING ABLE TO STOP OR CONTROL WORRYING: 2
7. FEELING AFRAID AS IF SOMETHING AWFUL MIGHT HAPPEN: 2
3. WORRYING TOO MUCH ABOUT DIFFERENT THINGS: 3
6. BECOMING EASILY ANNOYED OR IRRITABLE: 3
4. TROUBLE RELAXING: 2
5. BEING SO RESTLESS THAT IT IS HARD TO SIT STILL: 2

## 2023-04-28 ASSESSMENT — PATIENT HEALTH QUESTIONNAIRE - PHQ9
8. MOVING OR SPEAKING SO SLOWLY THAT OTHER PEOPLE COULD HAVE NOTICED. OR THE OPPOSITE, BEING SO FIGETY OR RESTLESS THAT YOU HAVE BEEN MOVING AROUND A LOT MORE THAN USUAL: 2
SUM OF ALL RESPONSES TO PHQ QUESTIONS 1-9: 18
3. TROUBLE FALLING OR STAYING ASLEEP: 3
4. FEELING TIRED OR HAVING LITTLE ENERGY: 3
7. TROUBLE CONCENTRATING ON THINGS, SUCH AS READING THE NEWSPAPER OR WATCHING TELEVISION: 2
2. FEELING DOWN, DEPRESSED OR HOPELESS: 2
SUM OF ALL RESPONSES TO PHQ QUESTIONS 1-9: 18
9. THOUGHTS THAT YOU WOULD BE BETTER OFF DEAD, OR OF HURTING YOURSELF: 0
SUM OF ALL RESPONSES TO PHQ9 QUESTIONS 1 & 2: 4
1. LITTLE INTEREST OR PLEASURE IN DOING THINGS: 2
SUM OF ALL RESPONSES TO PHQ QUESTIONS 1-9: 18
5. POOR APPETITE OR OVEREATING: 2
SUM OF ALL RESPONSES TO PHQ QUESTIONS 1-9: 18
6. FEELING BAD ABOUT YOURSELF - OR THAT YOU ARE A FAILURE OR HAVE LET YOURSELF OR YOUR FAMILY DOWN: 2

## 2023-04-28 NOTE — PATIENT INSTRUCTIONS
Return to see Dr. Feliz November in 2 weeks  Incorporate more downtime into your routine  View downtime as recharging your battery  Practice assertive communication  Continue engagement in stress management strategies, such as spending time with boyfriend or going to the gym   Reframe thoughts surrounding guilt  Call [facility] office if mood significantly worsens    Cognitive Distortions Checklist    1. All-or-nothing thinking: You look at things in absolute, black-and-white categories. 2. Over-generalization: You view a negative event as a never-ending pattern of defeat. 3. Mental filter: You dwell on the negatives. 4. Discounting the positives: You insist that your accomplishments or positive qualities don't count. 5. Jumping to conclusions:      (a) Mind-reading: You assume that people are reacting negatively to you when  there's no definitive evidence to support this;     (b) Fortune-telling: You arbitrarily predict that things will turn out badly    6. Magnification or minimization: You blow things way out of proportion or you shrink their importance. 7. Emotional reasoning: You reason from how you feel: \"I feel like an idiot, so I really must be one. \"    8. \"Should\" statements: You criticize yourself (or other people) with \"shoulds,\" \"oughts,\" \"musts,\" and \"have tos. \"    9. Labeling: Instead of saying \"I made a mistake,\" you tell yourself, \"I'm a jerk\" or \"a fool\" or \"a loser. \"    10. Personalization and blame: You blame yourself for something you weren't entirely responsible for, or you blame other people and deny your role in the problem. 11. Catastrophizing: You assume the worst case scenario will happen. Self-Defeating Beliefs    1. Emotional Perfectionism. \"I should always feel happy, confident, and in control of my emotions. \"    2. Emotophobia. \"I should never feel angry, anxious, inadequate, jealous, or vulnerable. \"    3. Conflict Phobia. \"People who love each other shouldn't fight. \"    4.
Statement Selected

## 2023-04-28 NOTE — PROGRESS NOTES
Behavioral Health Consultation  ZUNILDA ERNST Psy.D. Psychologist  4/28/2023  10:28 AM EDT      Time spent with Patient: 30 minutes  This is patient's fourth  [facility] appointment. Reason for Consult:    Chief Complaint   Patient presents with    Anxiety    Depression     Referring Provider: Serge Carroll MD  [address]  [phone]    Feedback given to PCP: not indicated at this time. S:  Pt reported school finished yesterday. Will have more free time over the summer. Father had an angry outburst that upset her. Feels she walks on eggshells at home and has experienced this throughout childhood. Fear of disappointing her parents. Discussed bx activation, incorporating more downtime, assertive communication, reframing thoughts related to guilt.      O:  MSE:    Appearance: good hygiene   Attitude: cooperative and friendly  Consciousness: alert  Orientation: oriented to person, place, time, general circumstance  Memory: recent and remote memory intact  Attention/Concentration: intact during session  Psychomotor Activity:normal  Eye Contact: normal  Speech: normal rate and volume, well-articulated  Mood: \"a little better\"  Affect: dysphoric  Perception: within normal limits  Thought Content: within normal limits  Thought Process: logical, coherent and goal-directed  Insight: good  Judgment: intact  Ability to understand instructions: Yes  Ability to respond meaningfully: Yes  Morbid Ideation: no   Suicide Assessment: no suicidal ideation, plan, or intent  Homicidal Ideation: no    History:    Medications:   Current Outpatient Medications   Medication Sig Dispense Refill    escitalopram (LEXAPRO) 10 MG tablet Take 1 tablet by mouth daily 30 tablet 3    albuterol sulfate HFA (PROVENTIL HFA) 108 (90 Base) MCG/ACT inhaler Inhale 2 puffs into the lungs every 6 hours as needed for Wheezing 1 each 0    busPIRone (BUSPAR) 5 MG tablet Take 1 tablet by mouth 3 times daily as needed (anxiety)

## 2023-05-18 RX ORDER — BUSPIRONE HYDROCHLORIDE 5 MG/1
TABLET ORAL
Qty: 60 TABLET | Refills: 0 | Status: SHIPPED | OUTPATIENT
Start: 2023-05-18

## 2023-05-25 DIAGNOSIS — F41.9 ANXIETY: ICD-10-CM

## 2023-05-25 RX ORDER — ESCITALOPRAM OXALATE 10 MG/1
10 TABLET ORAL DAILY
Qty: 30 TABLET | Refills: 3 | Status: SHIPPED | OUTPATIENT
Start: 2023-05-25

## 2023-05-25 NOTE — TELEPHONE ENCOUNTER
Medication:   Requested Prescriptions     Pending Prescriptions Disp Refills    escitalopram (LEXAPRO) 10 MG tablet 30 tablet 3     Sig: Take 1 tablet by mouth daily        Last Filled:      Patient Phone Number: [phone] (home)     Last appt: 4/4/2023   Next appt: 7/6/2023    Last OARRS: No flowsheet data found. Preferred Pharmacy:   [facility] [id number] - [place] [address] [facility]  [address] [place] [address]  Phone: [phone] Fax: [phone]    [facility] [id number] [facility] [phone] Jennifer Durham [phone]  [address] [facility] [address]  Phone: [phone] Fax: [phone]  Medication:   Requested Prescriptions     Pending Prescriptions Disp Refills    escitalopram (LEXAPRO) 10 MG tablet 30 tablet 3     Sig: Take 1 tablet by mouth daily        Last Filled:      Patient Phone Number: [phone] (home)     Last appt: 4/4/2023   Next appt: 7/6/2023    Last OARRS: No flowsheet data found.     Preferred Pharmacy:   [facility] [id number] - [id number] [address] [facility]  [address] [place] [address]  Phone: [phone] Fax: [phone]    [facility] [id number] [facility] [phone] Jenniferden Durham [phone]  [address] [place] [address] [facility] [address]  Phone: [phone] Fax: [phone]

## 2023-05-26 ENCOUNTER — OFFICE VISIT (OUTPATIENT)
Dept: PSYCHOLOGY | Age: 23
End: 2023-05-26
Payer: COMMERCIAL

## 2023-05-26 DIAGNOSIS — F33.1 MODERATE EPISODE OF RECURRENT MAJOR DEPRESSIVE DISORDER (HCC): ICD-10-CM

## 2023-05-26 DIAGNOSIS — F41.1 GENERALIZED ANXIETY DISORDER WITH PANIC ATTACKS: Primary | ICD-10-CM

## 2023-05-26 DIAGNOSIS — F41.0 GENERALIZED ANXIETY DISORDER WITH PANIC ATTACKS: Primary | ICD-10-CM

## 2023-05-26 PROCEDURE — 90832 PSYTX W PT 30 MINUTES: CPT

## 2023-05-26 ASSESSMENT — PATIENT HEALTH QUESTIONNAIRE - PHQ9
6. FEELING BAD ABOUT YOURSELF - OR THAT YOU ARE A FAILURE OR HAVE LET YOURSELF OR YOUR FAMILY DOWN: 2
9. THOUGHTS THAT YOU WOULD BE BETTER OFF DEAD, OR OF HURTING YOURSELF: 0
3. TROUBLE FALLING OR STAYING ASLEEP: 3
SUM OF ALL RESPONSES TO PHQ9 QUESTIONS 1 & 2: 4
2. FEELING DOWN, DEPRESSED OR HOPELESS: 2
7. TROUBLE CONCENTRATING ON THINGS, SUCH AS READING THE NEWSPAPER OR WATCHING TELEVISION: 1
8. MOVING OR SPEAKING SO SLOWLY THAT OTHER PEOPLE COULD HAVE NOTICED. OR THE OPPOSITE, BEING SO FIGETY OR RESTLESS THAT YOU HAVE BEEN MOVING AROUND A LOT MORE THAN USUAL: 2
SUM OF ALL RESPONSES TO PHQ QUESTIONS 1-9: 16
5. POOR APPETITE OR OVEREATING: 2
SUM OF ALL RESPONSES TO PHQ QUESTIONS 1-9: 16
1. LITTLE INTEREST OR PLEASURE IN DOING THINGS: 2
4. FEELING TIRED OR HAVING LITTLE ENERGY: 2

## 2023-05-26 ASSESSMENT — ANXIETY QUESTIONNAIRES
7. FEELING AFRAID AS IF SOMETHING AWFUL MIGHT HAPPEN: 2
1. FEELING NERVOUS, ANXIOUS, OR ON EDGE: 2
5. BEING SO RESTLESS THAT IT IS HARD TO SIT STILL: 2
2. NOT BEING ABLE TO STOP OR CONTROL WORRYING: 2
3. WORRYING TOO MUCH ABOUT DIFFERENT THINGS: 2
4. TROUBLE RELAXING: 2
6. BECOMING EASILY ANNOYED OR IRRITABLE: 3
GAD7 TOTAL SCORE: 15

## 2023-05-26 NOTE — PROGRESS NOTES
Behavioral Health Consultation  ZUNILDA ERNST Psy.D. Psychologist  5/26/2023  9:25 AM EDT      Time spent with Patient: 30 minutes  This is patient's fifth  [facility] appointment. Reason for Consult:    Chief Complaint   Patient presents with    Depression    Anxiety     Referring Provider: Yvette Villasenor MD  [address]  [phone]    Feedback given to PCP: not indicated at this time. S:  Mood has been better. Threw back out last week. Primary stressor that is provoking anxiety is work/boss. Uses breathing exercises. Relationship with father has improved. She feels like he is maintaining a more balanced perspective. Pt is leaving for [address] vacation soon.  Going to the gym and spending time with her boyfriend in [employer] helps with her mood the most.    O:  MSE:    Appearance: good hygiene   Attitude: cooperative and friendly  Consciousness: alert  Orientation: oriented to person, place, time, general circumstance  Memory: recent and remote memory intact  Attention/Concentration: intact during session  Psychomotor Activity:normal  Eye Contact: normal  Speech: normal rate and volume, well-articulated  Mood: \"better\"  Affect:  overall full range  Perception: within normal limits  Thought Content: within normal limits  Thought Process: logical, coherent and goal-directed  Insight: good  Judgment: intact  Ability to understand instructions: Yes  Ability to respond meaningfully: Yes  Morbid Ideation: no   Suicide Assessment: no suicidal ideation, plan, or intent  Homicidal Ideation: no    History:    Medications:   Current Outpatient Medications   Medication Sig Dispense Refill    escitalopram (LEXAPRO) 10 MG tablet Take 1 tablet by mouth daily 30 tablet 3    busPIRone (BUSPAR) 5 MG tablet TAKE ONE TABLET BY MOUTH THREE TIMES A DAY AS NEEDED FOR ANXIETY 60 tablet 0    albuterol sulfate HFA (PROVENTIL HFA) 108 (90 Base) MCG/ACT inhaler Inhale 2 puffs into the lungs every 6 hours as

## 2023-05-26 NOTE — PATIENT INSTRUCTIONS
Return to see Dr. Preston Wilkerson in 2-4 weeks  Practice imagery exercises  Continue other relaxation exercises, such as box breathing and grounding   Continue practicing cognitive restructuring strategies  Call [facility] office if mood significantly worsens    Imagery. A second technique for controlling worry is to use imagery. One way to use imagery is to distract your mind from your worries by imagining something else, such as a pleasant memory or scene. While this can be helpful in achieving a state a physical relaxation and for providing short-term relief from your worries, it does little for resolving your worries in the long run. An alternative to this is to actually use mental imagery to picture the situation you are most worried about. Your first reaction to this may be, This is what I do anyway. It will only worsen the problem!   Actually, when we worry, our mind tends to rapidly jump from image to image, racing from one terrible, awful scenario to another and this maintains our high level of worry. When we stop this mental game of leapfrWorkers On Call and simply focus on one bad scene for an extended period of time, we begin to habituate to the scene, and the worry we feel in response to the scene weakens. This is because our perceptions of threat tend to change as we are exposed to the source of our fear. For example, if I am frightened by snakes but force myself to sit in a room with a snake for several hours, I will eventually feel more relaxed around the snake. In the course of the time spent with the snake I will likely have reevaluated my thoughts about the degree of danger that snake represents to me. I also will have simply tired of being frightened. This is what can happen to your worry when you use the imagery technique to control your worry. The following imagery exercise is best done well before bedtime so that it does not interrupt your sleep.       1.  Pick something you are worried about and

## 2023-06-23 ENCOUNTER — OFFICE VISIT (OUTPATIENT)
Dept: PSYCHOLOGY | Age: 23
End: 2023-06-23
Payer: COMMERCIAL

## 2023-06-23 DIAGNOSIS — F41.1 GENERALIZED ANXIETY DISORDER WITH PANIC ATTACKS: Primary | ICD-10-CM

## 2023-06-23 DIAGNOSIS — F41.0 GENERALIZED ANXIETY DISORDER WITH PANIC ATTACKS: Primary | ICD-10-CM

## 2023-06-23 DIAGNOSIS — F33.1 MODERATE EPISODE OF RECURRENT MAJOR DEPRESSIVE DISORDER (HCC): ICD-10-CM

## 2023-06-23 PROCEDURE — 90832 PSYTX W PT 30 MINUTES: CPT

## 2023-06-23 ASSESSMENT — PATIENT HEALTH QUESTIONNAIRE - PHQ9
SUM OF ALL RESPONSES TO PHQ QUESTIONS 1-9: 19
4. FEELING TIRED OR HAVING LITTLE ENERGY: 3
SUM OF ALL RESPONSES TO PHQ QUESTIONS 1-9: 19
SUM OF ALL RESPONSES TO PHQ QUESTIONS 1-9: 19
3. TROUBLE FALLING OR STAYING ASLEEP: 3
SUM OF ALL RESPONSES TO PHQ QUESTIONS 1-9: 19
9. THOUGHTS THAT YOU WOULD BE BETTER OFF DEAD, OR OF HURTING YOURSELF: 0
6. FEELING BAD ABOUT YOURSELF - OR THAT YOU ARE A FAILURE OR HAVE LET YOURSELF OR YOUR FAMILY DOWN: 3
5. POOR APPETITE OR OVEREATING: 2
8. MOVING OR SPEAKING SO SLOWLY THAT OTHER PEOPLE COULD HAVE NOTICED. OR THE OPPOSITE, BEING SO FIGETY OR RESTLESS THAT YOU HAVE BEEN MOVING AROUND A LOT MORE THAN USUAL: 1
2. FEELING DOWN, DEPRESSED OR HOPELESS: 2
1. LITTLE INTEREST OR PLEASURE IN DOING THINGS: 2
SUM OF ALL RESPONSES TO PHQ9 QUESTIONS 1 & 2: 4
7. TROUBLE CONCENTRATING ON THINGS, SUCH AS READING THE NEWSPAPER OR WATCHING TELEVISION: 3

## 2023-06-23 ASSESSMENT — ANXIETY QUESTIONNAIRES
4. TROUBLE RELAXING: 2
1. FEELING NERVOUS, ANXIOUS, OR ON EDGE: 2
2. NOT BEING ABLE TO STOP OR CONTROL WORRYING: 3
5. BEING SO RESTLESS THAT IT IS HARD TO SIT STILL: 2
7. FEELING AFRAID AS IF SOMETHING AWFUL MIGHT HAPPEN: 2
6. BECOMING EASILY ANNOYED OR IRRITABLE: 3
GAD7 TOTAL SCORE: 16
3. WORRYING TOO MUCH ABOUT DIFFERENT THINGS: 2

## 2023-06-23 NOTE — PROGRESS NOTES
(TRISTENE;CARLOSE;LESSINA) 0.1-20 MG-MCG per tablet Take 1 tablet by mouth daily       No current facility-administered medications for this visit. Social History:   Social History     Socioeconomic History    Marital status: Unknown     Spouse name: Not on file    Number of children: Not on file    Years of education: Not on file    Highest education level: Not on file   Occupational History    Not on file   Tobacco Use    Smoking status: Never    Smokeless tobacco: Never   Substance and Sexual Activity    Alcohol use: Not Currently     Alcohol/week: 2.0 standard drinks     Types: 2 Drinks containing 0.5 oz of alcohol per week    Drug use: Never    Sexual activity: Yes     Partners: Male   Other Topics Concern    Not on file   Social History Narrative    Not on file     Social Determinants of Health     Financial Resource Strain: Low Risk     Difficulty of Paying Living Expenses: Not hard at all   Food Insecurity: No Food Insecurity    Worried About Running Out of Food in the Last Year: Never true    [address] in the Last Year: Never true   Transportation Needs: No Transportation Needs    Lack of Transportation (Medical): No    Lack of Transportation (Non-Medical): No   Physical Activity: Not on file   Stress: Not on file   Social Connections: Not on file   Intimate Partner Violence: Not on file   Housing Stability: Unknown    Unable to Pay for Housing in the Last Year: Not on file    Number of Places Lived in the Last Year: Not on file    Unstable Housing in the Last Year: No     TOBACCO:   reports that she has never smoked. She has never used smokeless tobacco.  ETOH:   reports that she does not currently use alcohol after a past usage of about 2.0 standard drinks per week. Family History:   Family History   Problem Relation Age of Onset    Breast Cancer Mother     Breast Cancer Maternal Grandmother        A:  Patient engaged and cooperative. Denied suicidal ideation. Insight and motivation are good.

## 2023-06-23 NOTE — PATIENT INSTRUCTIONS
Return to see Dr. Liyah Vidal in 2 weeks  Remind yourself not everything can be perfect and practice sitting in discomfort or imperfections   Continue engagement in relaxation and grounding strategies  Focus on staying present when you are engaging in an enjoyable activity   Call [facility] office if mood significantly worsens    MINDFULNESS    Mindfulness means paying attention in a particular way: on purpose, in the present moment, and non-judgmentally. Colonel Whitten    \"Mindfulness is the basic human ability to be fully present, aware of where we are and what were doing, and not overly reactive or overwhelmed by whats going on around us. \"   -Mindful Jackpot    Paying attention on purpose  Mindfulness involves paying attention on purpose. Mindfulness involves a conscious direction of our awareness. This can mean purposefully directing our attention to the breath, or a particular emotion, or an activity as simple as eating. Doing so allows us to actively shape the mind. Paying attention in the present moment  Left to itself, the mind wanders through all kinds of thoughts -- including thoughts expressing anger, craving, depression, self-pity, and anxiety. As we indulge in these kinds of thoughts, we reinforce those emotions and cause ourselves to suffer. These thoughts usually center around the past or future. But the past no longer exists. The future is just a fantasy until it happens. The one moment we actually can experience -- the present moment -- is the one we seem most to avoid. By purposefully directing our awareness away from thoughts about the past or future and instead towards the [address] - our present moment experience - we decrease the effect of these thoughts on our lives. Paying attention non-judgmentally  Mindfulness is an emotionally non-reactive state. We don't  that this experience is good and that one is bad.  Or, if we do make those judgments, we dont get upset in reaction to our

## 2023-07-06 ENCOUNTER — OFFICE VISIT (OUTPATIENT)
Dept: PRIMARY CARE CLINIC | Age: 23
End: 2023-07-06
Payer: COMMERCIAL

## 2023-07-06 VITALS
HEIGHT: 64 IN | DIASTOLIC BLOOD PRESSURE: 80 MMHG | SYSTOLIC BLOOD PRESSURE: 110 MMHG | OXYGEN SATURATION: 96 % | BODY MASS INDEX: 30.9 KG/M2 | WEIGHT: 181 LBS | RESPIRATION RATE: 13 BRPM | TEMPERATURE: 97.6 F | HEART RATE: 67 BPM

## 2023-07-06 DIAGNOSIS — J45.20 MILD INTERMITTENT ASTHMA WITHOUT COMPLICATION: ICD-10-CM

## 2023-07-06 DIAGNOSIS — L70.9 ACNE, UNSPECIFIED ACNE TYPE: ICD-10-CM

## 2023-07-06 DIAGNOSIS — F41.9 ANXIETY: Primary | ICD-10-CM

## 2023-07-06 PROCEDURE — 99214 OFFICE O/P EST MOD 30 MIN: CPT | Performed by: FAMILY MEDICINE

## 2023-07-06 RX ORDER — ESCITALOPRAM OXALATE 10 MG/1
10 TABLET ORAL DAILY
Qty: 30 TABLET | Refills: 5 | Status: SHIPPED | OUTPATIENT
Start: 2023-07-06

## 2023-07-14 ENCOUNTER — TELEPHONE (OUTPATIENT)
Dept: PSYCHOLOGY | Age: 23
End: 2023-07-14

## 2023-08-04 ENCOUNTER — OFFICE VISIT (OUTPATIENT)
Dept: PSYCHOLOGY | Age: 23
End: 2023-08-04
Payer: COMMERCIAL

## 2023-08-04 DIAGNOSIS — F33.1 MODERATE EPISODE OF RECURRENT MAJOR DEPRESSIVE DISORDER (HCC): ICD-10-CM

## 2023-08-04 DIAGNOSIS — F41.1 GENERALIZED ANXIETY DISORDER WITH PANIC ATTACKS: Primary | ICD-10-CM

## 2023-08-04 DIAGNOSIS — F41.0 GENERALIZED ANXIETY DISORDER WITH PANIC ATTACKS: Primary | ICD-10-CM

## 2023-08-04 PROCEDURE — 90832 PSYTX W PT 30 MINUTES: CPT

## 2023-08-04 ASSESSMENT — ANXIETY QUESTIONNAIRES
6. BECOMING EASILY ANNOYED OR IRRITABLE: 3
1. FEELING NERVOUS, ANXIOUS, OR ON EDGE: 2
5. BEING SO RESTLESS THAT IT IS HARD TO SIT STILL: 2
3. WORRYING TOO MUCH ABOUT DIFFERENT THINGS: 2
GAD7 TOTAL SCORE: 18
7. FEELING AFRAID AS IF SOMETHING AWFUL MIGHT HAPPEN: 3
4. TROUBLE RELAXING: 3
2. NOT BEING ABLE TO STOP OR CONTROL WORRYING: 3

## 2023-08-04 ASSESSMENT — PATIENT HEALTH QUESTIONNAIRE - PHQ9
SUM OF ALL RESPONSES TO PHQ QUESTIONS 1-9: 19
2. FEELING DOWN, DEPRESSED OR HOPELESS: 2
4. FEELING TIRED OR HAVING LITTLE ENERGY: 3
7. TROUBLE CONCENTRATING ON THINGS, SUCH AS READING THE NEWSPAPER OR WATCHING TELEVISION: 2
1. LITTLE INTEREST OR PLEASURE IN DOING THINGS: 3
3. TROUBLE FALLING OR STAYING ASLEEP: 3
SUM OF ALL RESPONSES TO PHQ QUESTIONS 1-9: 19
5. POOR APPETITE OR OVEREATING: 2
9. THOUGHTS THAT YOU WOULD BE BETTER OFF DEAD, OR OF HURTING YOURSELF: 0
SUM OF ALL RESPONSES TO PHQ QUESTIONS 1-9: 19
8. MOVING OR SPEAKING SO SLOWLY THAT OTHER PEOPLE COULD HAVE NOTICED. OR THE OPPOSITE, BEING SO FIGETY OR RESTLESS THAT YOU HAVE BEEN MOVING AROUND A LOT MORE THAN USUAL: 2
SUM OF ALL RESPONSES TO PHQ QUESTIONS 1-9: 19
6. FEELING BAD ABOUT YOURSELF - OR THAT YOU ARE A FAILURE OR HAVE LET YOURSELF OR YOUR FAMILY DOWN: 2
SUM OF ALL RESPONSES TO PHQ9 QUESTIONS 1 & 2: 5

## 2023-08-04 NOTE — PATIENT INSTRUCTIONS
be true for all of us. Notice a change in your emotional state as you change your thinking. As your thoughts become more realistic, you will probably notice a decrease in anxiety and tension, and an increase in your ability to cope.

## 2023-08-04 NOTE — PROGRESS NOTES
Grandmother        A:  Patient engaged and cooperative. Denied suicidal ideation. Insight and motivation are good. Re-administered PHQ-9 and CAITLYN-7 (see below). Patient endorses Moderately Severe symptoms of depression and Severe symptoms of anxiety. PHQ-9 Questionaire 8/4/2023 6/23/2023 5/26/2023 4/28/2023 4/14/2023 3/31/2023 3/17/2023   Little interest or pleasure in doing things 3 2 2 2 2 2 2   Feeling down, depressed, or hopeless 2 2 2 2 3 1 2   Trouble falling or staying asleep, or sleeping too much 3 3 3 3 3 0 0   Feeling tired or having little energy 3 3 2 3 3 3 3   Poor appetite or overeating 2 2 2 2 2 2 2   Feeling bad about yourself - or that you are a failure or have let yourself or your family down 2 3 2 2 2 1 2   Trouble concentrating on things, such as reading the newspaper or watching television 2 3 1 2 3 3 3   Moving or speaking so slowly that other people could have noticed.  Or the opposite - being so fidgety or restless that you have been moving around a lot more than usual 2 1 2 2 2 2 2   Thoughts that you would be better off dead, or of hurting yourself in some way 0 0 0 0 0 0 0   PHQ-9 Total Score 19 19 16 18 20 14 16     PHQ Scores 8/4/2023 6/23/2023 5/26/2023 4/28/2023 4/14/2023 3/31/2023 3/17/2023   PHQ2 Score 5 4 4 4 5 3 4   PHQ9 Score 19 19 16 18 20 14 16       Interpretation of Total Score Depression Severity: 1-4 = Minimal depression, 5-9 = Mild depression, 10-14 = Moderate depression, 15-19 = Moderately severe depression, 20-27 = Severe depression     CAITLYN-7 SCREENING 8/4/2023 6/23/2023 5/26/2023 4/28/2023 4/14/2023 3/31/2023 3/17/2023   Feeling nervous, anxious, or on edge More than half the days More than half the days More than half the days More than half the days Nearly every day Nearly every day Nearly every day   Not being able to stop or control worrying Nearly every day Nearly every day More than half the days More than half the days Nearly every day More than half the

## 2023-09-08 ENCOUNTER — OFFICE VISIT (OUTPATIENT)
Dept: PSYCHOLOGY | Age: 23
End: 2023-09-08
Payer: COMMERCIAL

## 2023-09-08 DIAGNOSIS — F41.1 GENERALIZED ANXIETY DISORDER WITH PANIC ATTACKS: Primary | ICD-10-CM

## 2023-09-08 DIAGNOSIS — F41.0 GENERALIZED ANXIETY DISORDER WITH PANIC ATTACKS: Primary | ICD-10-CM

## 2023-09-08 DIAGNOSIS — F33.1 MODERATE EPISODE OF RECURRENT MAJOR DEPRESSIVE DISORDER (HCC): ICD-10-CM

## 2023-09-08 PROCEDURE — 90832 PSYTX W PT 30 MINUTES: CPT

## 2023-09-08 ASSESSMENT — PATIENT HEALTH QUESTIONNAIRE - PHQ9
4. FEELING TIRED OR HAVING LITTLE ENERGY: 2
2. FEELING DOWN, DEPRESSED OR HOPELESS: 2
6. FEELING BAD ABOUT YOURSELF - OR THAT YOU ARE A FAILURE OR HAVE LET YOURSELF OR YOUR FAMILY DOWN: 2
1. LITTLE INTEREST OR PLEASURE IN DOING THINGS: 3
SUM OF ALL RESPONSES TO PHQ QUESTIONS 1-9: 19
9. THOUGHTS THAT YOU WOULD BE BETTER OFF DEAD, OR OF HURTING YOURSELF: 0
SUM OF ALL RESPONSES TO PHQ9 QUESTIONS 1 & 2: 5
3. TROUBLE FALLING OR STAYING ASLEEP: 3
7. TROUBLE CONCENTRATING ON THINGS, SUCH AS READING THE NEWSPAPER OR WATCHING TELEVISION: 2
5. POOR APPETITE OR OVEREATING: 3
8. MOVING OR SPEAKING SO SLOWLY THAT OTHER PEOPLE COULD HAVE NOTICED. OR THE OPPOSITE, BEING SO FIGETY OR RESTLESS THAT YOU HAVE BEEN MOVING AROUND A LOT MORE THAN USUAL: 2

## 2023-09-08 ASSESSMENT — ANXIETY QUESTIONNAIRES
GAD7 TOTAL SCORE: 18
5. BEING SO RESTLESS THAT IT IS HARD TO SIT STILL: 2
1. FEELING NERVOUS, ANXIOUS, OR ON EDGE: 3
4. TROUBLE RELAXING: 2
7. FEELING AFRAID AS IF SOMETHING AWFUL MIGHT HAPPEN: 2
2. NOT BEING ABLE TO STOP OR CONTROL WORRYING: 3
6. BECOMING EASILY ANNOYED OR IRRITABLE: 3
3. WORRYING TOO MUCH ABOUT DIFFERENT THINGS: 3

## 2023-09-08 NOTE — PATIENT INSTRUCTIONS
Return to see Dr. Estella St in 2 weeks  Make time to relax  Prioritize responsibilities to aid in time and stress management  Engage in active relaxation strategies  Call [facility] office if mood significantly worsens

## 2023-09-08 NOTE — PROGRESS NOTES
into the lungs every 6 hours as needed for Wheezing 1 each 0     No current facility-administered medications for this visit. Social History:   Social History     Socioeconomic History    Marital status: Unknown     Spouse name: Not on file    Number of children: Not on file    Years of education: Not on file    Highest education level: Not on file   Occupational History    Not on file   Tobacco Use    Smoking status: Never    Smokeless tobacco: Never   Substance and Sexual Activity    Alcohol use: Not Currently     Alcohol/week: 2.0 standard drinks     Types: 2 Drinks containing 0.5 oz of alcohol per week    Drug use: Never    Sexual activity: Yes     Partners: Male   Other Topics Concern    Not on file   Social History Narrative    Not on file     Social Determinants of Health     Financial Resource Strain: Low Risk     Difficulty of Paying Living Expenses: Not hard at all   Food Insecurity: No Food Insecurity    Worried About Running Out of Food in the Last Year: Never true    [address] in the Last Year: Never true   Transportation Needs: No Transportation Needs    Lack of Transportation (Medical): No    Lack of Transportation (Non-Medical): No   Physical Activity: Not on file   Stress: Not on file   Social Connections: Not on file   Intimate Partner Violence: Not on file   Housing Stability: Unknown    Unable to Pay for Housing in the Last Year: Not on file    Number of Places Lived in the Last Year: Not on file    Unstable Housing in the Last Year: No     TOBACCO:   reports that she has never smoked. She has never used smokeless tobacco.  ETOH:   reports that she does not currently use alcohol after a past usage of about 2.0 standard drinks per week. Family History:   Family History   Problem Relation Age of Onset    Breast Cancer Mother     Breast Cancer Maternal Grandmother        A:  Patient engaged and cooperative. Denied suicidal ideation. Insight and motivation are good.      Re-administered

## 2023-09-29 ENCOUNTER — OFFICE VISIT (OUTPATIENT)
Dept: PSYCHOLOGY | Age: 23
End: 2023-09-29
Payer: COMMERCIAL

## 2023-09-29 DIAGNOSIS — F41.1 GENERALIZED ANXIETY DISORDER WITH PANIC ATTACKS: Primary | ICD-10-CM

## 2023-09-29 DIAGNOSIS — F33.1 MODERATE EPISODE OF RECURRENT MAJOR DEPRESSIVE DISORDER (HCC): ICD-10-CM

## 2023-09-29 DIAGNOSIS — F41.0 GENERALIZED ANXIETY DISORDER WITH PANIC ATTACKS: Primary | ICD-10-CM

## 2023-09-29 PROCEDURE — 90832 PSYTX W PT 30 MINUTES: CPT

## 2023-09-29 ASSESSMENT — ANXIETY QUESTIONNAIRES
6. BECOMING EASILY ANNOYED OR IRRITABLE: 3
1. FEELING NERVOUS, ANXIOUS, OR ON EDGE: 3
5. BEING SO RESTLESS THAT IT IS HARD TO SIT STILL: 2
4. TROUBLE RELAXING: 2
7. FEELING AFRAID AS IF SOMETHING AWFUL MIGHT HAPPEN: 2
3. WORRYING TOO MUCH ABOUT DIFFERENT THINGS: 3
2. NOT BEING ABLE TO STOP OR CONTROL WORRYING: 3
GAD7 TOTAL SCORE: 18

## 2023-09-29 ASSESSMENT — PATIENT HEALTH QUESTIONNAIRE - PHQ9
4. FEELING TIRED OR HAVING LITTLE ENERGY: 2
SUM OF ALL RESPONSES TO PHQ QUESTIONS 1-9: 17
6. FEELING BAD ABOUT YOURSELF - OR THAT YOU ARE A FAILURE OR HAVE LET YOURSELF OR YOUR FAMILY DOWN: 2
3. TROUBLE FALLING OR STAYING ASLEEP: 3
SUM OF ALL RESPONSES TO PHQ QUESTIONS 1-9: 17
7. TROUBLE CONCENTRATING ON THINGS, SUCH AS READING THE NEWSPAPER OR WATCHING TELEVISION: 2
9. THOUGHTS THAT YOU WOULD BE BETTER OFF DEAD, OR OF HURTING YOURSELF: 0
8. MOVING OR SPEAKING SO SLOWLY THAT OTHER PEOPLE COULD HAVE NOTICED. OR THE OPPOSITE, BEING SO FIGETY OR RESTLESS THAT YOU HAVE BEEN MOVING AROUND A LOT MORE THAN USUAL: 2
1. LITTLE INTEREST OR PLEASURE IN DOING THINGS: 2
2. FEELING DOWN, DEPRESSED OR HOPELESS: 2
SUM OF ALL RESPONSES TO PHQ9 QUESTIONS 1 & 2: 4
SUM OF ALL RESPONSES TO PHQ QUESTIONS 1-9: 17
5. POOR APPETITE OR OVEREATING: 2
SUM OF ALL RESPONSES TO PHQ QUESTIONS 1-9: 17

## 2023-09-29 NOTE — PROGRESS NOTES
Behavioral Health Consultation  ZUNILDA ERNST Psy.D. Psychologist  9/29/2023  8:00 AM EDT      Time spent with Patient: 30 minutes  This is patient's ninth  [facility] appointment. Reason for Consult:    Chief Complaint   Patient presents with    Depression    Anxiety     Referring Provider: Steven Vicente MD  [address]  [phone]    Feedback given to PCP: not indicated at this time. S:  Pt reported she participated in horse show last weekend which went well. Was a relief for her. Discussed that she thinks she wants to quit work. Was told she does not have any more vacation time. Does not feel it is a healthy environment at times. Feels there is a lack of communication from boss. Completes others' work responsibilities. Wants to prioritize school. She is afraid she will disappoint her boss. Despite these stressors, she feels her parents have been more understanding and supportive of her decisions.      O:  MSE:    Appearance: good hygiene   Attitude: cooperative and friendly  Consciousness: alert  Orientation: oriented to person, place, time, general circumstance  Memory: recent and remote memory intact  Attention/Concentration: intact during session  Psychomotor Activity:normal  Eye Contact: normal  Speech: normal rate and volume, well-articulated  Mood: stressed  Affect: congruent  Perception: within normal limits  Thought Content: within normal limits  Thought Process: logical, coherent and goal-directed  Insight: good  Judgment: intact  Ability to understand instructions: Yes  Ability to respond meaningfully: Yes  Morbid Ideation: no   Suicide Assessment: no suicidal ideation, plan, or intent  Homicidal Ideation: no    History:    Medications:   Current Outpatient Medications   Medication Sig Dispense Refill    escitalopram (LEXAPRO) 10 MG tablet Take 1 tablet by mouth daily 30 tablet 5    busPIRone (BUSPAR) 5 MG tablet TAKE ONE TABLET BY MOUTH THREE TIMES A DAY AS

## 2023-09-29 NOTE — PATIENT INSTRUCTIONS
Return to see Dr. Mammie Siemens in 2-4 weeks  Make time to relax  Continue to practice more balanced thinking  Maintain good sleep habits and exercise  Utilize social support  Call PC office if mood significantly worsens

## 2023-10-20 ENCOUNTER — TELEPHONE (OUTPATIENT)
Dept: PRIMARY CARE CLINIC | Age: 23
End: 2023-10-20

## 2023-10-20 NOTE — TELEPHONE ENCOUNTER
Called pt as she did not show to scheduled appointment at 830am. Pt answered and identified by . Rescheduled for 10/25.  No safety concerns noted or observed during call    This encounter was not an error

## 2023-10-25 ENCOUNTER — TELEMEDICINE (OUTPATIENT)
Dept: PSYCHOLOGY | Age: 23
End: 2023-10-25
Payer: COMMERCIAL

## 2023-10-25 DIAGNOSIS — F41.0 GENERALIZED ANXIETY DISORDER WITH PANIC ATTACKS: Primary | ICD-10-CM

## 2023-10-25 DIAGNOSIS — F33.1 MODERATE EPISODE OF RECURRENT MAJOR DEPRESSIVE DISORDER (HCC): ICD-10-CM

## 2023-10-25 DIAGNOSIS — F41.1 GENERALIZED ANXIETY DISORDER WITH PANIC ATTACKS: Primary | ICD-10-CM

## 2023-10-25 PROCEDURE — 90832 PSYTX W PT 30 MINUTES: CPT

## 2023-10-25 NOTE — PROGRESS NOTES
Behavioral Health Consultation  ZUNILDA ERNST Psy.D. Psychologist  10/25/2023  1:02 PM EDT      Time spent with Patient: 30 minutes  This is patient's tenth  [facility] appointment. Reason for Consult:    Chief Complaint   Patient presents with    Anxiety    Depression     Referring Provider: Patsy Bajwa MD  [address] [place] [address] Dr  [phone]    Feedback given to PCP: not indicated at this time. TELEHEALTH VISIT -- Audio/Visual    Services were provided through a video synchronous discussion virtually to substitute for in-person clinic visit. Pt gave verbal informed consent to participate in telehealth services. Conducted a risk-benefit analysis and determined that the patient's presenting problems are consistent with the use of telepsychology. Determined that the patient has sufficient knowledge and skills in the use of technology enabling them to adequately benefit from telepsychology. It was determined that this patient was able to be properly treated without an in-person session. Patient verified that they were currently located at the [place] address that was provided during registration. Verified the following information:  Patient's identification: Yes  Patient location: [place]   [place], [place]   Patient's call back number: [phone]   Patient's emergency contact's name and number, as well as permission to contact them if needed: Extended Emergency Contact Information  Primary Emergency Contact: Louise Pressley  Address: [address] [place] [address] of [address] [address] Phone: [phone]  Relation: Parent  Secondary Emergency Contact: Winston Morales  Address: [address] [place] [address] of [address] Phone: [phone]  Relation: Parent     Provider location: [place], [place]    S:  Pt reported she ended up not quitting job, boss is leaving. Feels anxiety has improved at work due to this.  Was able to Prenatal Vitamins

## 2023-10-25 NOTE — PATIENT INSTRUCTIONS
Return to see Dr. Chyna desouza   Follow up with referral for ongoing therapy   Continue to prioritize responsibilities and make time to relax  Continue relaxation and grounding strategies  Continue to incorporate enjoyable and self-care activities into routine  Maintain adherence to psychotropic medication  Continue stress management strategies  Call PC office if mood/anxiety significantly worsens    [place] Therapy Referrals    PsychologyToday. Hotlist    Inclusive [facility]) - www.inclusivecounseling. [facility]    [facility] ([place]) - www.mainspringnky. Hotlist    [facility]- www.eccounselingllc.Hotlist. (SATYA Main, has immediate openings, all virtual)    [facility] - www.selfcareohio. Hotlist    Pos[facility]) - www.positivelyspeakingcounsCureSquare. Hotlist    [address]. [facility]    Selene Nicolas [facility] (virtual, in [facility]) - [employer].UniSmart. com/    [facility] CEDRICK Perez (virtual) - [email or web address]    [employer] [facility] [employer] (virtual) - [email or web address]    [facility] and [address] [place] [address] (virtual, in [place]) - [email or web address]    Rosalie Melissa, [facility] (virtual) - [employer]/    SATYA Sy (virtual, Slovenian speaker) - NoInsuranceAgent.MyFit. Hotlist/us/therapists/ynrmc-nmynop-oczsjv-Nicaraguan-english-Bearden-oh/0151435    TANG Traylor (virtual) - ConnectionCreators.co.uk    [facility] ([address]) - Nevaeheal.co.uk. co/    Guzman Lawson - [email or web address].[email or web address]

## 2023-11-28 ENCOUNTER — OFFICE VISIT (OUTPATIENT)
Dept: PRIMARY CARE CLINIC | Age: 23
End: 2023-11-28
Payer: COMMERCIAL

## 2023-11-28 VITALS
HEIGHT: 64 IN | BODY MASS INDEX: 34.08 KG/M2 | SYSTOLIC BLOOD PRESSURE: 120 MMHG | TEMPERATURE: 97.8 F | DIASTOLIC BLOOD PRESSURE: 70 MMHG | HEART RATE: 76 BPM | WEIGHT: 199.6 LBS | OXYGEN SATURATION: 97 %

## 2023-11-28 DIAGNOSIS — J45.21 MILD INTERMITTENT ASTHMA WITH ACUTE EXACERBATION: Primary | ICD-10-CM

## 2023-11-28 PROCEDURE — 99214 OFFICE O/P EST MOD 30 MIN: CPT | Performed by: FAMILY MEDICINE

## 2023-11-28 RX ORDER — FLUTICASONE PROPIONATE 220 UG/1
2 AEROSOL, METERED RESPIRATORY (INHALATION) 2 TIMES DAILY
Qty: 1 EACH | Refills: 1 | Status: SHIPPED | OUTPATIENT
Start: 2023-11-28 | End: 2024-11-27

## 2023-11-28 RX ORDER — AZITHROMYCIN 250 MG/1
250 TABLET, FILM COATED ORAL SEE ADMIN INSTRUCTIONS
Qty: 6 TABLET | Refills: 0 | Status: SHIPPED | OUTPATIENT
Start: 2023-11-28 | End: 2023-12-03

## 2023-11-28 RX ORDER — ALBUTEROL SULFATE 90 UG/1
2 AEROSOL, METERED RESPIRATORY (INHALATION) EVERY 6 HOURS PRN
Qty: 1 EACH | Refills: 0 | Status: SHIPPED | OUTPATIENT
Start: 2023-11-28

## 2023-11-28 ASSESSMENT — ENCOUNTER SYMPTOMS
EYES NEGATIVE: 1
GASTROINTESTINAL NEGATIVE: 1
WHEEZING: 1
SINUS PRESSURE: 1
COUGH: 1
SHORTNESS OF BREATH: 1

## 2024-02-04 DIAGNOSIS — J45.21 MILD INTERMITTENT ASTHMA WITH ACUTE EXACERBATION: ICD-10-CM

## 2024-02-05 RX ORDER — FLUTICASONE PROPIONATE 220 UG/1
2 AEROSOL, METERED RESPIRATORY (INHALATION) 2 TIMES DAILY
Qty: 12 G | Refills: 1 | Status: SHIPPED | OUTPATIENT
Start: 2024-02-05

## 2024-02-05 NOTE — TELEPHONE ENCOUNTER
Medication:   Requested Prescriptions     Pending Prescriptions Disp Refills    fluticasone (FLOVENT HFA) 220 MCG/ACT inhaler [Pharmacy Med Name: FLUTICASONE PROP  MCG] 12 g      Sig: INHALE 2 PUFFS BY MOUTH TWICE A DAY     Last Filled:  11/28/23    Last appt: 12/19/2023   Next appt: 4/19/2024    Last OARRS:        No data to display

## 2024-04-10 DIAGNOSIS — J45.21 MILD INTERMITTENT ASTHMA WITH ACUTE EXACERBATION: ICD-10-CM

## 2024-04-10 NOTE — TELEPHONE ENCOUNTER
Medication:   Requested Prescriptions     Pending Prescriptions Disp Refills    fluticasone (FLOVENT HFA) 220 MCG/ACT inhaler [Pharmacy Med Name: FLUTICASONE PROP  MCG] 12 g 1     Sig: INHALE 2 PUFFS BY MOUTH TWICE A DAY     Last Filled:  2/5/2024    Last appt: 12/19/2023   Next appt: 4/19/2024    Last OARRS:        No data to display

## 2024-04-11 RX ORDER — FLUTICASONE PROPIONATE 220 UG/1
2 AEROSOL, METERED RESPIRATORY (INHALATION) 2 TIMES DAILY
Qty: 12 G | Refills: 1 | Status: SHIPPED | OUTPATIENT
Start: 2024-04-11

## 2024-04-24 DIAGNOSIS — F41.9 ANXIETY: ICD-10-CM

## 2024-04-24 NOTE — TELEPHONE ENCOUNTER
Medication:   Requested Prescriptions     Pending Prescriptions Disp Refills    escitalopram (LEXAPRO) 10 MG tablet [Pharmacy Med Name: ESCITALOPRAM 10 MG TABLET] 90 tablet      Sig: TAKE 1 TABLET BY MOUTH DAILY     Last filled: 7/6/23  Last appt: 12/19/2023   Next appt: Visit date not found    Last OARRS:        No data to display

## 2024-04-25 RX ORDER — ESCITALOPRAM OXALATE 10 MG/1
10 TABLET ORAL DAILY
Qty: 90 TABLET | Refills: 0 | Status: SHIPPED | OUTPATIENT
Start: 2024-04-25

## 2024-05-05 DIAGNOSIS — J45.21 MILD INTERMITTENT ASTHMA WITH ACUTE EXACERBATION: ICD-10-CM

## 2024-05-06 RX ORDER — ALBUTEROL SULFATE 90 UG/1
2 AEROSOL, METERED RESPIRATORY (INHALATION) EVERY 6 HOURS PRN
Qty: 1 EACH | Refills: 0 | Status: SHIPPED | OUTPATIENT
Start: 2024-05-06

## 2024-05-06 NOTE — TELEPHONE ENCOUNTER
Medication:   Requested Prescriptions     Pending Prescriptions Disp Refills    albuterol sulfate HFA (PROVENTIL HFA) 108 (90 Base) MCG/ACT inhaler 1 each 0     Sig: Inhale 2 puffs into the lungs every 6 hours as needed for Wheezing     Last Filled:  11.28.23    Last appt: 12/19/2023   Next appt: Visit date not found    Last OARRS:        No data to display

## 2024-07-21 DIAGNOSIS — F41.9 ANXIETY: ICD-10-CM

## 2024-07-22 RX ORDER — ESCITALOPRAM OXALATE 10 MG/1
10 TABLET ORAL DAILY
Qty: 90 TABLET | Refills: 0 | Status: SHIPPED | OUTPATIENT
Start: 2024-07-22

## 2024-07-22 NOTE — TELEPHONE ENCOUNTER
Medication:   Requested Prescriptions     Pending Prescriptions Disp Refills    escitalopram (LEXAPRO) 10 MG tablet [Pharmacy Med Name: ESCITALOPRAM 10 MG TABLET] 90 tablet 0     Sig: TAKE 1 TABLET BY MOUTH DAILY     Last filled: 4/25/24  Last appt: 12/19/2023   Next appt: Visit date not found    Last OARRS:        No data to display

## 2024-09-03 DIAGNOSIS — F41.9 ANXIETY: ICD-10-CM

## 2024-09-03 DIAGNOSIS — J45.21 MILD INTERMITTENT ASTHMA WITH ACUTE EXACERBATION: ICD-10-CM

## 2024-09-03 NOTE — TELEPHONE ENCOUNTER
Medication:   Requested Prescriptions     Pending Prescriptions Disp Refills    albuterol sulfate HFA (PROVENTIL HFA) 108 (90 Base) MCG/ACT inhaler 1 each 0     Sig: Inhale 2 puffs into the lungs every 6 hours as needed for Wheezing    escitalopram (LEXAPRO) 10 MG tablet 90 tablet 0     Sig: Take 1 tablet by mouth daily     Last Filled:  5/6/24 7/22/24    Last appt: 12/19/2023   Next appt: LVM due for OV    Last OARRS:        No data to display

## 2024-09-04 RX ORDER — ESCITALOPRAM OXALATE 10 MG/1
10 TABLET ORAL DAILY
Qty: 90 TABLET | Refills: 0 | OUTPATIENT
Start: 2024-09-04

## 2024-09-04 RX ORDER — ALBUTEROL SULFATE 90 UG/1
2 AEROSOL, METERED RESPIRATORY (INHALATION) EVERY 6 HOURS PRN
Qty: 1 EACH | Refills: 0 | OUTPATIENT
Start: 2024-09-04

## 2024-09-09 ENCOUNTER — OFFICE VISIT (OUTPATIENT)
Dept: PRIMARY CARE CLINIC | Age: 24
End: 2024-09-09
Payer: COMMERCIAL

## 2024-09-09 VITALS
SYSTOLIC BLOOD PRESSURE: 120 MMHG | HEART RATE: 76 BPM | HEIGHT: 64 IN | WEIGHT: 194 LBS | BODY MASS INDEX: 33.12 KG/M2 | DIASTOLIC BLOOD PRESSURE: 70 MMHG

## 2024-09-09 DIAGNOSIS — J45.21 MILD INTERMITTENT ASTHMA WITH ACUTE EXACERBATION: Primary | ICD-10-CM

## 2024-09-09 DIAGNOSIS — F41.9 ANXIETY: ICD-10-CM

## 2024-09-09 PROCEDURE — 99214 OFFICE O/P EST MOD 30 MIN: CPT | Performed by: FAMILY MEDICINE

## 2024-09-09 RX ORDER — ESCITALOPRAM OXALATE 10 MG/1
10 TABLET ORAL DAILY
Qty: 90 TABLET | Refills: 1 | Status: SHIPPED | OUTPATIENT
Start: 2024-09-09

## 2024-09-09 RX ORDER — MONTELUKAST SODIUM 10 MG/1
10 TABLET ORAL DAILY
Qty: 30 TABLET | Refills: 3 | Status: SHIPPED | OUTPATIENT
Start: 2024-09-09

## 2024-09-09 SDOH — ECONOMIC STABILITY: FOOD INSECURITY: WITHIN THE PAST 12 MONTHS, THE FOOD YOU BOUGHT JUST DIDN'T LAST AND YOU DIDN'T HAVE MONEY TO GET MORE.: NEVER TRUE

## 2024-09-09 SDOH — ECONOMIC STABILITY: FOOD INSECURITY: WITHIN THE PAST 12 MONTHS, YOU WORRIED THAT YOUR FOOD WOULD RUN OUT BEFORE YOU GOT MONEY TO BUY MORE.: NEVER TRUE

## 2024-09-09 SDOH — ECONOMIC STABILITY: INCOME INSECURITY: HOW HARD IS IT FOR YOU TO PAY FOR THE VERY BASICS LIKE FOOD, HOUSING, MEDICAL CARE, AND HEATING?: NOT HARD AT ALL

## 2024-09-09 ASSESSMENT — COLUMBIA-SUICIDE SEVERITY RATING SCALE - C-SSRS
6. HAVE YOU EVER DONE ANYTHING, STARTED TO DO ANYTHING, OR PREPARED TO DO ANYTHING TO END YOUR LIFE?: NO
2. HAVE YOU ACTUALLY HAD ANY THOUGHTS OF KILLING YOURSELF?: NO
1. WITHIN THE PAST MONTH, HAVE YOU WISHED YOU WERE DEAD OR WISHED YOU COULD GO TO SLEEP AND NOT WAKE UP?: NO

## 2024-09-09 ASSESSMENT — PATIENT HEALTH QUESTIONNAIRE - PHQ9
SUM OF ALL RESPONSES TO PHQ QUESTIONS 1-9: 13
2. FEELING DOWN, DEPRESSED OR HOPELESS: MORE THAN HALF THE DAYS
5. POOR APPETITE OR OVEREATING: MORE THAN HALF THE DAYS
6. FEELING BAD ABOUT YOURSELF - OR THAT YOU ARE A FAILURE OR HAVE LET YOURSELF OR YOUR FAMILY DOWN: SEVERAL DAYS
SUM OF ALL RESPONSES TO PHQ QUESTIONS 1-9: 13
10. IF YOU CHECKED OFF ANY PROBLEMS, HOW DIFFICULT HAVE THESE PROBLEMS MADE IT FOR YOU TO DO YOUR WORK, TAKE CARE OF THINGS AT HOME, OR GET ALONG WITH OTHER PEOPLE: SOMEWHAT DIFFICULT
SUM OF ALL RESPONSES TO PHQ QUESTIONS 1-9: 13
8. MOVING OR SPEAKING SO SLOWLY THAT OTHER PEOPLE COULD HAVE NOTICED. OR THE OPPOSITE, BEING SO FIGETY OR RESTLESS THAT YOU HAVE BEEN MOVING AROUND A LOT MORE THAN USUAL: SEVERAL DAYS
3. TROUBLE FALLING OR STAYING ASLEEP: SEVERAL DAYS
9. THOUGHTS THAT YOU WOULD BE BETTER OFF DEAD, OR OF HURTING YOURSELF: NOT AT ALL
SUM OF ALL RESPONSES TO PHQ QUESTIONS 1-9: 13
4. FEELING TIRED OR HAVING LITTLE ENERGY: NEARLY EVERY DAY
7. TROUBLE CONCENTRATING ON THINGS, SUCH AS READING THE NEWSPAPER OR WATCHING TELEVISION: NEARLY EVERY DAY

## 2024-09-09 ASSESSMENT — ENCOUNTER SYMPTOMS
SHORTNESS OF BREATH: 0
COUGH: 1
WHEEZING: 0
SINUS PRESSURE: 0
GASTROINTESTINAL NEGATIVE: 1
EYES NEGATIVE: 1

## 2024-12-11 DIAGNOSIS — J45.21 MILD INTERMITTENT ASTHMA WITH ACUTE EXACERBATION: ICD-10-CM

## 2024-12-12 RX ORDER — ALBUTEROL SULFATE 90 UG/1
2 INHALANT RESPIRATORY (INHALATION) EVERY 6 HOURS PRN
Qty: 1 EACH | Refills: 0 | Status: SHIPPED | OUTPATIENT
Start: 2024-12-12

## 2024-12-12 NOTE — TELEPHONE ENCOUNTER
Medication:   Requested Prescriptions     Pending Prescriptions Disp Refills    albuterol sulfate HFA (PROVENTIL HFA) 108 (90 Base) MCG/ACT inhaler 1 each 0     Sig: Inhale 2 puffs into the lungs every 6 hours as needed for Wheezing     Last Filled:  5.6.24    Last appt: 9/9/2024   Next appt: Visit date not found    Last OARRS:        No data to display

## 2024-12-19 DIAGNOSIS — F41.9 ANXIETY: ICD-10-CM

## 2024-12-19 RX ORDER — ESCITALOPRAM OXALATE 10 MG/1
10 TABLET ORAL DAILY
Qty: 90 TABLET | Refills: 1 | Status: SHIPPED | OUTPATIENT
Start: 2024-12-19

## 2024-12-19 NOTE — TELEPHONE ENCOUNTER
Medication:   Requested Prescriptions     Pending Prescriptions Disp Refills    escitalopram (LEXAPRO) 10 MG tablet 90 tablet 1     Sig: Take 1 tablet by mouth daily     Last Filled:  9.9.24    Last appt: 9/9/2024   Next appt: Visit date not found    Last OARRS:        No data to display

## 2025-01-13 RX ORDER — MONTELUKAST SODIUM 10 MG/1
10 TABLET ORAL DAILY
Qty: 30 TABLET | Refills: 1 | Status: SHIPPED | OUTPATIENT
Start: 2025-01-13

## 2025-01-13 NOTE — TELEPHONE ENCOUNTER
Medication:   Requested Prescriptions     Pending Prescriptions Disp Refills    montelukast (SINGULAIR) 10 MG tablet [Pharmacy Med Name: MONTELUKAST SOD 10 MG TABLET] 30 tablet 3     Sig: TAKE 1 TABLET BY MOUTH DAILY     Last Filled:  9.9.24    Last appt: 9/9/2024   Next appt: Visit date not found    Last OARRS:        No data to display

## 2025-03-18 NOTE — TELEPHONE ENCOUNTER
Medication:   Requested Prescriptions     Pending Prescriptions Disp Refills    montelukast (SINGULAIR) 10 MG tablet 30 tablet 1     Sig: Take 1 tablet by mouth daily     Last Filled:  lm need appointment     Last appt: 9/9/2024   Next appt: Visit date not found    Last OARRS:        No data to display

## 2025-03-19 NOTE — TELEPHONE ENCOUNTER
Medication:   Requested Prescriptions     Pending Prescriptions Disp Refills    montelukast (SINGULAIR) 10 MG tablet 30 tablet 1     Sig: Take 1 tablet by mouth daily     Last Filled:  01/13/25    Last appt: 9/9/2024   Next appt: Visit date not found    Last OARRS:        No data to display

## 2025-03-20 RX ORDER — MONTELUKAST SODIUM 10 MG/1
10 TABLET ORAL DAILY
Qty: 30 TABLET | Refills: 0 | Status: SHIPPED | OUTPATIENT
Start: 2025-03-20

## 2025-04-23 NOTE — TELEPHONE ENCOUNTER
Medication:   Requested Prescriptions     Pending Prescriptions Disp Refills    montelukast (SINGULAIR) 10 MG tablet [Pharmacy Med Name: MONTELUKAST SOD 10 MG TABLET] 30 tablet 0     Sig: TAKE 1 TABLET BY MOUTH DAILY     Last Filled:  3.20.25    Last appt: 9/9/2024   Next appt: Visit date not found  Sent mcm due for ov    Last OARRS:        No data to display

## 2025-04-25 RX ORDER — MONTELUKAST SODIUM 10 MG/1
10 TABLET ORAL DAILY
Qty: 30 TABLET | Refills: 0 | Status: SHIPPED | OUTPATIENT
Start: 2025-04-25

## 2025-06-14 DIAGNOSIS — F41.9 ANXIETY: ICD-10-CM

## 2025-06-16 RX ORDER — MONTELUKAST SODIUM 10 MG/1
10 TABLET ORAL DAILY
Qty: 30 TABLET | Refills: 0 | Status: SHIPPED | OUTPATIENT
Start: 2025-06-16

## 2025-06-16 NOTE — TELEPHONE ENCOUNTER
Medication:   Requested Prescriptions     Pending Prescriptions Disp Refills    escitalopram (LEXAPRO) 10 MG tablet [Pharmacy Med Name: ESCITALOPRAM 10 MG TABLET] 90 tablet 1     Sig: TAKE 1 TABLET BY MOUTH DAILY     Last Filled:  12.19.24    Last appt: 9/9/2024   Next appt: Visit date not found    Last OARRS:        No data to display

## 2025-06-16 NOTE — TELEPHONE ENCOUNTER
Medication:   Requested Prescriptions     Pending Prescriptions Disp Refills    montelukast (SINGULAIR) 10 MG tablet 30 tablet 0     Sig: Take 1 tablet by mouth daily     Last Filled:  04/25/2025    Last appt: 9/9/2024   Next appt: Visit date not found    Last OARRS:        No data to display

## 2025-06-23 RX ORDER — ESCITALOPRAM OXALATE 10 MG/1
10 TABLET ORAL DAILY
Qty: 90 TABLET | Refills: 0 | Status: SHIPPED | OUTPATIENT
Start: 2025-06-23

## 2025-06-24 NOTE — TELEPHONE ENCOUNTER
Tried calling patient several times still no response. Pt read my chart message no appointment schedule

## 2025-07-16 NOTE — TELEPHONE ENCOUNTER
Medication:   Requested Prescriptions     Pending Prescriptions Disp Refills    montelukast (SINGULAIR) 10 MG tablet [Pharmacy Med Name: MONTELUKAST SOD 10 MG TABLET] 30 tablet 0     Sig: TAKE 1 TABLET BY MOUTH DAILY     Last Filled:  6.16.25    Last appt: 9/9/2024   Next appt: Visit date not found  Left message for return call.    Due for ov    Last OARRS:        No data to display

## 2025-07-30 RX ORDER — MONTELUKAST SODIUM 10 MG/1
10 TABLET ORAL DAILY
Qty: 30 TABLET | Refills: 0 | OUTPATIENT
Start: 2025-07-30